# Patient Record
Sex: FEMALE | Race: WHITE | NOT HISPANIC OR LATINO | Employment: OTHER | URBAN - NONMETROPOLITAN AREA
[De-identification: names, ages, dates, MRNs, and addresses within clinical notes are randomized per-mention and may not be internally consistent; named-entity substitution may affect disease eponyms.]

---

## 2023-04-04 DIAGNOSIS — K21.9 GASTROESOPHAGEAL REFLUX DISEASE WITHOUT ESOPHAGITIS: Primary | ICD-10-CM

## 2023-04-04 DIAGNOSIS — K86.0 ALCOHOL-INDUCED CHRONIC PANCREATITIS (MULTI): Primary | ICD-10-CM

## 2023-04-04 DIAGNOSIS — G89.29 CHRONIC ABDOMINAL PAIN: ICD-10-CM

## 2023-04-04 DIAGNOSIS — R10.9 CHRONIC ABDOMINAL PAIN: ICD-10-CM

## 2023-04-04 PROBLEM — L73.2 HIDRADENITIS SUPPURATIVA: Status: ACTIVE | Noted: 2023-04-04

## 2023-04-04 PROBLEM — K86.3 PANCREATIC PSEUDOCYST (HHS-HCC): Status: ACTIVE | Noted: 2023-04-04

## 2023-04-04 PROBLEM — K57.32 DIVERTICULITIS, COLON: Status: ACTIVE | Noted: 2023-04-04

## 2023-04-04 PROBLEM — E11.40 DIABETIC NEUROPATHY, PAINFUL (MULTI): Status: ACTIVE | Noted: 2023-04-04

## 2023-04-04 PROBLEM — R39.12 WEAK URINARY STREAM: Status: RESOLVED | Noted: 2023-04-04 | Resolved: 2023-04-04

## 2023-04-04 PROBLEM — K46.9 ABDOMINAL HERNIA: Status: ACTIVE | Noted: 2023-04-04

## 2023-04-04 PROBLEM — R80.9: Status: ACTIVE | Noted: 2023-04-04

## 2023-04-04 PROBLEM — E10.65 UNCONTROLLED TYPE 1 DIABETES MELLITUS WITH HYPERGLYCEMIA (MULTI): Status: ACTIVE | Noted: 2023-04-04

## 2023-04-04 PROBLEM — I34.0 MITRAL REGURGITATION: Status: ACTIVE | Noted: 2023-04-04

## 2023-04-04 PROBLEM — I07.1 TRICUSPID REGURGITATION: Status: ACTIVE | Noted: 2023-04-04

## 2023-04-04 PROBLEM — Q18.1 CYST ON EAR: Status: RESOLVED | Noted: 2023-04-04 | Resolved: 2023-04-04

## 2023-04-04 PROBLEM — E87.1 HYPONATREMIA: Status: ACTIVE | Noted: 2023-04-04

## 2023-04-04 PROBLEM — D05.11 DUCTAL CARCINOMA IN SITU (DCIS) OF RIGHT BREAST: Status: ACTIVE | Noted: 2023-04-04

## 2023-04-04 PROBLEM — R60.0 EDEMA OF BOTH LEGS: Status: ACTIVE | Noted: 2023-04-04

## 2023-04-04 PROBLEM — H95.192 ENCOUNTER FOR DEBRIDEMENT OF LEFT POSTMASTOIDECTOMY CAVITY: Status: RESOLVED | Noted: 2023-04-04 | Resolved: 2023-04-04

## 2023-04-04 PROBLEM — E10.29: Status: ACTIVE | Noted: 2023-04-04

## 2023-04-04 PROBLEM — J30.9 ALLERGIC RHINITIS: Status: ACTIVE | Noted: 2023-04-04

## 2023-04-04 PROBLEM — I42.9 CARDIOMYOPATHY (MULTI): Status: RESOLVED | Noted: 2023-04-04 | Resolved: 2023-04-04

## 2023-04-04 PROBLEM — R74.8 ELEVATED LIVER ENZYMES: Status: ACTIVE | Noted: 2023-04-04

## 2023-04-04 PROBLEM — R11.2 NAUSEA AND VOMITING: Status: RESOLVED | Noted: 2023-04-04 | Resolved: 2023-04-04

## 2023-04-04 PROBLEM — G47.00 INSOMNIA: Status: ACTIVE | Noted: 2023-04-04

## 2023-04-04 PROBLEM — K86.1 CHRONIC PANCREATITIS (MULTI): Status: ACTIVE | Noted: 2023-04-04

## 2023-04-04 PROBLEM — J45.909 ASTHMA (HHS-HCC): Status: ACTIVE | Noted: 2023-04-04

## 2023-04-04 PROBLEM — R92.8 ABNORMALITY OF RIGHT BREAST ON SCREENING MAMMOGRAM: Status: ACTIVE | Noted: 2023-04-04

## 2023-04-04 PROBLEM — F41.1 GENERALIZED ANXIETY DISORDER: Status: ACTIVE | Noted: 2023-04-04

## 2023-04-04 PROBLEM — I51.81 STRESS-INDUCED CARDIOMYOPATHY: Status: RESOLVED | Noted: 2023-04-04 | Resolved: 2023-04-04

## 2023-04-04 PROBLEM — F33.1 MDD (MAJOR DEPRESSIVE DISORDER), RECURRENT EPISODE, MODERATE (MULTI): Status: ACTIVE | Noted: 2023-04-04

## 2023-04-04 PROBLEM — E53.8 VITAMIN B12 DEFICIENCY: Status: ACTIVE | Noted: 2023-04-04

## 2023-04-04 PROBLEM — R53.83 FATIGUE: Status: RESOLVED | Noted: 2023-04-04 | Resolved: 2023-04-04

## 2023-04-04 PROBLEM — R59.9 ENLARGED LYMPH NODE: Status: RESOLVED | Noted: 2023-04-04 | Resolved: 2023-04-04

## 2023-04-04 RX ORDER — OXYCODONE HYDROCHLORIDE 10 MG/1
10 TABLET ORAL EVERY 4 HOURS PRN
Qty: 150 TABLET | Refills: 0 | Status: SHIPPED | OUTPATIENT
Start: 2023-04-04 | End: 2023-05-04 | Stop reason: SDUPTHER

## 2023-04-04 RX ORDER — FAMOTIDINE 40 MG/1
40 TABLET, FILM COATED ORAL DAILY
Qty: 90 TABLET | Refills: 1 | Status: SHIPPED | OUTPATIENT
Start: 2023-04-04 | End: 2023-07-25

## 2023-04-04 RX ORDER — OXYCODONE HYDROCHLORIDE 10 MG/1
10 TABLET ORAL EVERY 4 HOURS PRN
COMMUNITY
Start: 2020-03-31 | End: 2023-04-04 | Stop reason: SDUPTHER

## 2023-04-04 NOTE — TELEPHONE ENCOUNTER
WANTS AN RX FOR FAMODINE FOR HER STOMACH, HAS BEEN USING HER HUSBANDS. TO [facility]

## 2023-04-04 NOTE — PROGRESS NOTES
Subjective   Patient ID: Laxmi Sousa is a 52 y.o. female who presents for No chief complaint on file..  HPI    Review of Systems    Objective   Physical Exam    Assessment/Plan

## 2023-04-26 DIAGNOSIS — E11.40 DIABETIC NEUROPATHY, PAINFUL (MULTI): Primary | ICD-10-CM

## 2023-04-26 DIAGNOSIS — J30.1 NON-SEASONAL ALLERGIC RHINITIS DUE TO POLLEN: Primary | ICD-10-CM

## 2023-04-26 RX ORDER — FLUTICASONE PROPIONATE 50 MCG
2 SPRAY, SUSPENSION (ML) NASAL DAILY
Qty: 48 G | Refills: 3 | Status: SHIPPED | OUTPATIENT
Start: 2023-04-26 | End: 2024-01-12 | Stop reason: SDUPTHER

## 2023-04-26 RX ORDER — GABAPENTIN 800 MG/1
800 TABLET ORAL 4 TIMES DAILY
Qty: 360 TABLET | Refills: 1 | Status: SHIPPED | OUTPATIENT
Start: 2023-04-26 | End: 2023-10-11

## 2023-04-26 RX ORDER — FLUTICASONE PROPIONATE 50 MCG
2 SPRAY, SUSPENSION (ML) NASAL DAILY
COMMUNITY
Start: 2013-05-13 | End: 2023-04-26 | Stop reason: SDUPTHER

## 2023-04-26 RX ORDER — GABAPENTIN 800 MG/1
1 TABLET ORAL 4 TIMES DAILY
COMMUNITY
Start: 2021-04-21 | End: 2023-04-26 | Stop reason: SDUPTHER

## 2023-05-04 DIAGNOSIS — R10.9 CHRONIC ABDOMINAL PAIN: ICD-10-CM

## 2023-05-04 DIAGNOSIS — K86.0 ALCOHOL-INDUCED CHRONIC PANCREATITIS (MULTI): ICD-10-CM

## 2023-05-04 DIAGNOSIS — G89.29 CHRONIC ABDOMINAL PAIN: ICD-10-CM

## 2023-05-04 RX ORDER — OXYCODONE HYDROCHLORIDE 10 MG/1
10 TABLET ORAL EVERY 4 HOURS PRN
Qty: 150 TABLET | Refills: 0 | Status: SHIPPED | OUTPATIENT
Start: 2023-05-04 | End: 2023-06-02 | Stop reason: SDUPTHER

## 2023-05-08 ENCOUNTER — OFFICE VISIT (OUTPATIENT)
Dept: PRIMARY CARE | Facility: CLINIC | Age: 53
End: 2023-05-08
Payer: MEDICARE

## 2023-05-08 VITALS
HEART RATE: 66 BPM | OXYGEN SATURATION: 97 % | HEIGHT: 62 IN | SYSTOLIC BLOOD PRESSURE: 122 MMHG | BODY MASS INDEX: 22.82 KG/M2 | WEIGHT: 124 LBS | DIASTOLIC BLOOD PRESSURE: 64 MMHG

## 2023-05-08 DIAGNOSIS — E10.65 UNCONTROLLED TYPE 1 DIABETES MELLITUS WITH HYPERGLYCEMIA (MULTI): ICD-10-CM

## 2023-05-08 DIAGNOSIS — J45.40 MODERATE PERSISTENT ASTHMA, UNSPECIFIED WHETHER COMPLICATED (HHS-HCC): ICD-10-CM

## 2023-05-08 DIAGNOSIS — R10.9 CHRONIC ABDOMINAL PAIN: Primary | ICD-10-CM

## 2023-05-08 DIAGNOSIS — F33.1 MDD (MAJOR DEPRESSIVE DISORDER), RECURRENT EPISODE, MODERATE (MULTI): ICD-10-CM

## 2023-05-08 DIAGNOSIS — D05.11 DUCTAL CARCINOMA IN SITU (DCIS) OF RIGHT BREAST: ICD-10-CM

## 2023-05-08 DIAGNOSIS — G89.29 CHRONIC ABDOMINAL PAIN: Primary | ICD-10-CM

## 2023-05-08 DIAGNOSIS — M19.049 LOCALIZED OSTEOARTHRITIS OF HAND, UNSPECIFIED LATERALITY: ICD-10-CM

## 2023-05-08 DIAGNOSIS — K86.0 ALCOHOL-INDUCED CHRONIC PANCREATITIS (MULTI): ICD-10-CM

## 2023-05-08 PROCEDURE — 99214 OFFICE O/P EST MOD 30 MIN: CPT | Performed by: FAMILY MEDICINE

## 2023-05-08 PROCEDURE — 3078F DIAST BP <80 MM HG: CPT | Performed by: FAMILY MEDICINE

## 2023-05-08 PROCEDURE — 4004F PT TOBACCO SCREEN RCVD TLK: CPT | Performed by: FAMILY MEDICINE

## 2023-05-08 PROCEDURE — 3074F SYST BP LT 130 MM HG: CPT | Performed by: FAMILY MEDICINE

## 2023-05-08 PROCEDURE — 4010F ACE/ARB THERAPY RXD/TAKEN: CPT | Performed by: FAMILY MEDICINE

## 2023-05-08 RX ORDER — FLUTICASONE PROPIONATE AND SALMETEROL 250; 50 UG/1; UG/1
1 POWDER RESPIRATORY (INHALATION)
COMMUNITY
Start: 2022-02-23 | End: 2023-05-08 | Stop reason: SDUPTHER

## 2023-05-08 RX ORDER — ONDANSETRON 4 MG/1
8 TABLET, FILM COATED ORAL EVERY 12 HOURS PRN
COMMUNITY
Start: 2017-07-07 | End: 2023-09-29 | Stop reason: SDUPTHER

## 2023-05-08 RX ORDER — TRAZODONE HYDROCHLORIDE 50 MG/1
50 TABLET ORAL 3 TIMES DAILY
COMMUNITY
Start: 2016-09-23 | End: 2023-05-24

## 2023-05-08 RX ORDER — PANTOPRAZOLE SODIUM 40 MG/1
40 TABLET, DELAYED RELEASE ORAL
COMMUNITY
Start: 2020-05-14

## 2023-05-08 RX ORDER — ASPIRIN 81 MG/1
81 TABLET ORAL DAILY
Status: ON HOLD | COMMUNITY
Start: 2020-11-21 | End: 2023-12-15 | Stop reason: ALTCHOICE

## 2023-05-08 RX ORDER — METOPROLOL SUCCINATE 25 MG/1
25 TABLET, EXTENDED RELEASE ORAL DAILY
COMMUNITY
Start: 2020-11-21 | End: 2024-01-12 | Stop reason: ALTCHOICE

## 2023-05-08 RX ORDER — BLOOD-GLUCOSE TRANSMITTER
EACH MISCELLANEOUS AS NEEDED
COMMUNITY
Start: 2023-03-14

## 2023-05-08 RX ORDER — FLUTICASONE PROPIONATE AND SALMETEROL 250; 50 UG/1; UG/1
1 POWDER RESPIRATORY (INHALATION)
Qty: 180 EACH | Refills: 3 | Status: SHIPPED | OUTPATIENT
Start: 2023-05-08 | End: 2023-07-25

## 2023-05-08 RX ORDER — ATORVASTATIN CALCIUM 10 MG/1
10 TABLET, FILM COATED ORAL DAILY
COMMUNITY
Start: 2019-06-20 | End: 2023-05-08 | Stop reason: SDUPTHER

## 2023-05-08 RX ORDER — ALBUTEROL SULFATE 90 UG/1
2 AEROSOL, METERED RESPIRATORY (INHALATION) EVERY 4 HOURS PRN
Qty: 18 G | Refills: 3 | Status: SHIPPED | OUTPATIENT
Start: 2023-05-08 | End: 2023-10-11

## 2023-05-08 RX ORDER — HYDROXYZINE HYDROCHLORIDE 50 MG/1
50 TABLET, FILM COATED ORAL 4 TIMES DAILY
COMMUNITY
Start: 2021-09-08

## 2023-05-08 RX ORDER — BLOOD-GLUCOSE SENSOR
EACH MISCELLANEOUS
COMMUNITY
Start: 2023-03-14

## 2023-05-08 RX ORDER — TRIAMTERENE/HYDROCHLOROTHIAZID 37.5-25 MG
1 TABLET ORAL DAILY
COMMUNITY
Start: 2021-09-17

## 2023-05-08 RX ORDER — ATORVASTATIN CALCIUM 10 MG/1
10 TABLET, FILM COATED ORAL DAILY
Qty: 90 TABLET | Refills: 3 | Status: SHIPPED | OUTPATIENT
Start: 2023-05-08 | End: 2024-01-12 | Stop reason: SDUPTHER

## 2023-05-08 RX ORDER — ALBUTEROL SULFATE 90 UG/1
2 AEROSOL, METERED RESPIRATORY (INHALATION) EVERY 4 HOURS PRN
COMMUNITY
Start: 2020-06-22 | End: 2023-05-08 | Stop reason: SDUPTHER

## 2023-05-08 RX ORDER — BLOOD-GLUCOSE,RECEIVER,CONT
1 EACH MISCELLANEOUS AS NEEDED
COMMUNITY
Start: 2022-07-07 | End: 2024-02-05 | Stop reason: WASHOUT

## 2023-05-08 RX ORDER — ANASTROZOLE 1 MG/1
1 TABLET ORAL DAILY
COMMUNITY
Start: 2022-11-03

## 2023-05-08 RX ORDER — LISINOPRIL 2.5 MG/1
2.5 TABLET ORAL DAILY
COMMUNITY
Start: 2016-12-10 | End: 2024-01-12 | Stop reason: SDUPTHER

## 2023-05-08 RX ORDER — MENTHOL 1.4 %
ADHESIVE PATCH, MEDICATED TOPICAL
Qty: 113 G | Refills: 3 | Status: ON HOLD | OUTPATIENT
Start: 2023-05-08 | End: 2023-12-15 | Stop reason: ALTCHOICE

## 2023-05-08 ASSESSMENT — LIFESTYLE VARIABLES: TOTAL SCORE: 1

## 2023-05-08 NOTE — PROGRESS NOTES
Subjective   Patient ID: Laxmi Sousa is a 52 y.o. female who presents for Follow-up (Medication follow up ).  HPI  Got the pump- A1c 7.5  Counting carbs and trying to watch eating now  No CP, SOB, palpitations, weakness, HA, vision changes  Gets numbness in hands and legs- on gabapentin  Pain in hands in the morning- hands will cramp when tries to do needle point  Some dizziness if gets up too fast    Pain in left flank pain- oxycodone helps with the pain (some times can get away doing only 30 mg instead of 40 mg)  No n/v  Having usual greasy diarrhea    Ophtho- 9/21- needs to see  Dentist- keeping up with  [place]- 6/22  Colonoscopy- 3/18 (repeat 3 years)- will be calling [place]  GABRIELLA-  UA/Micro- 4/21  Mammo- 7/22- per mammogram  DEXA- 11/22  PAP- 2015- will see GYN  Lung CT-  AAA-  EKG-  Pneumovax- 2013  Prevnar-  Flu- 9/22  Shingrix-  Td-     OARRS:  Neil Louie,  on 5/8/2023  4:33 PM  I have personally reviewed the OARRS report for Laxmi Sousa. I have considered the risks of abuse, dependence, addiction and diversion    Is the patient prescribed a combination of a benzodiazepine and opioid?  No    Last Urine Drug Screen / ordered today: No- will get at follow up  Recent Results (from the past 56215 hour(s))   OPIATE/OPIOID/BENZO PRESCRIPTION COMPLIANCE    Collection Time: 04/21/21  3:55 PM   Result Value Ref Range    DRUG SCREEN COMMENT URINE SEE BELOW     Creatine, Urine 13.4 (A) mg/dL    Specific Gravity, Urine Drug 1.0250 Valid 1.0020-1.0200    Amphetamine Screen, Urine PRESUMPTIVE NEGATIVE NEGATIVE    Barbiturate Screen, Urine PRESUMPTIVE NEGATIVE NEGATIVE    Cannabinoid Screen, Urine PRESUMPTIVE NEGATIVE NEGATIVE    Cocaine Screen, Urine PRESUMPTIVE NEGATIVE NEGATIVE    PCP Screen, Urine PRESUMPTIVE NEGATIVE NEGATIVE    7-Aminoclonazepam <25 Cutoff <25 ng/mL    Alpha-Hydroxyalprazolam <25 Cutoff <25 ng/mL    Alpha-Hydroxymidazolam <25 Cutoff <25 ng/mL    Alprazolam <25 Cutoff <25 ng/mL     Chlordiazepoxide <25 Cutoff <25 ng/mL    Clonazepam <25 Cutoff <25 ng/mL    Diazepam <25 Cutoff <25 ng/mL    Lorazepam <25 Cutoff <25 ng/mL    Midazolam <25 Cutoff <25 ng/mL    Nordiazepam <25 Cutoff <25 ng/mL    Oxazepam <25 Cutoff <25 ng/mL    Temazepam <25 Cutoff <25 ng/mL    Zolpidem <25 Cutoff <25 ng/mL    Zolpidem Metabolite (ZCA) <25 Cutoff <25 ng/mL    6-Acetylmorphine <25 Cutoff <25 ng/mL    Codeine <50 Cutoff <50 ng/mL    Hydrocodone <25 Cutoff <25 ng/mL    Hydromorphone <25 Cutoff <25 ng/mL    Morphine Urine <50 Cutoff <50 ng/mL    Norhydrocodone <25 Cutoff <25 ng/mL    Noroxycodone >1000 (A) Cutoff <25 ng/mL    Oxycodone 327 (A) Cutoff <25 ng/mL    Oxymorphone 42 (A) Cutoff <25 ng/mL    Tramadol <50 Cutoff <50 ng/mL    O-Desmethyltramadol <50 Cutoff <50 ng/mL    Fentanyl <2.5 Cutoff<2.5 ng/mL    Norfentanyl <2.5 Cutoff<2.5 ng/mL    METHADONE CONFIRMATION,URINE <25 Cutoff <25 ng/mL    EDDP <25 Cutoff <25 ng/mL     Results are as expected.     Controlled Substance Agreement:  Date of the Last Agreement: 2023  Reviewed Controlled Substance Agreement including but not limited to the benefits, risks, and alternatives to treatment with a Controlled Substance medication(s).    Opioids:  What is the patient's goal of therapy? Better functioning during the day  Is this being achieved with current treatment? Yes    I have calculated the patient's Morphine Dose Equivalent (MED):   I have considered referral to Pain Management and/or a specialist, and do not feel it is necessary at this time.    I feel that it is clinically indicated to continue this current medication regimen after consideration of alternative therapies, and other non-opioid treatment.    Opioid Risk Screening:  Family History of Substance Abuse  Alcohol: 0 (2023  4:00 PM)  Illegal Dru (2023  4:00 PM)  Prescription Dru (2023  4:00 PM)    Personal History of Substance Abuse  Alcohol: 0 (2023  4:00 PM)  Illegal Drugs: 0  (5/8/2023  4:00 PM)  Prescription Drugs: 0 (5/8/2023  4:00 PM)    Patient Age (16-45)  Age (16-45): 0 (5/8/2023  4:00 PM)    History of Preadolescent Sexual Abuse  History of Preadolescent Sexual Abuse: 0 (5/8/2023  4:00 PM)    Psychological Disease  Attention Deficit Disorder, Obsessive Compulsive Disorder, Bipolar, Schizophrenia: 0 (5/8/2023  4:00 PM)  Depression: 1 (5/8/2023  4:00 PM)    Total Score  Total Score: 1 (5/8/2023  4:00 PM)    Total Score Risk Category  TOTAL SCORE CATEGORY: Low Risk (0-3) (5/8/2023  4:00 PM)        Pain Assessment:  Analgesia  What was your pain level on average during the past week?: 6  What was your pain level at its worst during the past week?: 8  What percentage of your pain has been relieved during the past week?: 80 %  Is the amount of pain relief you are now obtaining from your current pain relievers enough to make a real difference in your life?: Y  Query to Clinician: Is the patient's pain relief clinically significant?: Yes    Activities of Daily Living  Physical Functioning: Better  Family Relationships: Better  Social Relationships: Better  Mood: Better  Sleep Patterns: Better  Overall Functioning: Better    Adverse Events  Is patient experiencing any side effects from current pain relievers?: N  Patient's Overall Severity of Side Effects: None      Assessment  Is your overall impression that this patient is benefiting from opioid therapy?: Yes  Specific Analgesic Plan: Continue present regimen              Current Outpatient Medications:     anastrozole (Arimidex) 1 mg tablet, Take 1 tablet (1 mg total) by mouth once daily., Disp: , Rfl:     aspirin 81 mg EC tablet, Take 1 tablet (81 mg) by mouth once daily., Disp: , Rfl:     Dexcom G6  misc, Inject 1 Device under the skin if needed., Disp: , Rfl:     Dexcom G6 Sensor device, CHANGE every 10 DAYS as directed, Disp: , Rfl:     Dexcom G6 Transmitter device, Inject under the skin if needed., Disp: , Rfl:      hydrOXYzine HCL (Atarax) 50 mg tablet, Take 1 tablet (50 mg) by mouth 4 times a day., Disp: , Rfl:     lisinopril 2.5 mg tablet, Take 1 tablet (2.5 mg) by mouth once daily., Disp: , Rfl:     metoprolol succinate XL (Toprol-XL) 25 mg 24 hr tablet, Take 1 tablet (25 mg) by mouth once daily., Disp: , Rfl:     ondansetron (Zofran) 4 mg tablet, Take 2 tablets (8 mg) by mouth every 12 hours if needed., Disp: , Rfl:     pantoprazole (ProtoNix) 40 mg EC tablet, Take 1 tablet (40 mg) by mouth once daily in the morning. Take before meals., Disp: , Rfl:     traZODone (Desyrel) 50 mg tablet, Take 1 tablet (50 mg) by mouth 3 times a day., Disp: , Rfl:     triamterene-hydrochlorothiazid (Maxzide-25) 37.5-25 mg tablet, Take 1 tablet by mouth once daily., Disp: , Rfl:     albuterol 90 mcg/actuation inhaler, Inhale 2 puffs every 4 hours if needed for shortness of breath., Disp: 18 g, Rfl: 3    atorvastatin (Lipitor) 10 mg tablet, Take 1 tablet (10 mg) by mouth once daily., Disp: 90 tablet, Rfl: 3    camphor-methyl salicyl-menthoL (Bengay Ultra Strength) 4-30-10 % cream, Apply twice a day to hands, Disp: 113 g, Rfl: 3    famotidine (Pepcid) 40 mg tablet, Take 1 tablet (40 mg) by mouth once daily., Disp: 90 tablet, Rfl: 1    fluticasone (Flonase) 50 mcg/actuation nasal spray, Administer 2 sprays into each nostril once daily., Disp: 48 g, Rfl: 3    fluticasone propion-salmeteroL (Advair Diskus) 250-50 mcg/dose diskus inhaler, Inhale 1 puff 2 times a day., Disp: 180 each, Rfl: 3    gabapentin (Neurontin) 800 mg tablet, Take 1 tablet (800 mg) by mouth 4 times a day., Disp: 360 tablet, Rfl: 1    oxyCODONE (Roxicodone) 10 mg immediate release tablet, Take 1 tablet (10 mg) by mouth every 4 hours if needed for severe pain (7 - 10)., Disp: 150 tablet, Rfl: 0   Past Surgical History:   Procedure Laterality Date    CHOLECYSTECTOMY  07/05/2013    Cholecystectomy Laparoscopic    LITHOTRIPSY  07/05/2013    Renal Lithotripsy    OTHER SURGICAL  HISTORY  2013    Endoscopic Retrograde Cholangiopancreatography (ERCP)    OTHER SURGICAL HISTORY  2019     section    OTHER SURGICAL HISTORY  2014    ERCP With Change Of Tube/Stent    OTHER SURGICAL HISTORY  2015    Incision And Drainage Of Skin Abscess Buttocks    TONSILLECTOMY  2018    Tonsillectomy    VENTRAL HERNIA REPAIR  2018    Ventral Hernia Repair      Past Medical History:   Diagnosis Date    Acute actinic otitis externa, left ear 2020    Acute actinic otitis externa of left ear    Acute candidiasis of vulva and vagina 2016    Vaginitis due to Candida albicans    Acute mastoiditis without complications, left ear 2020    Acute mastoiditis of left side    Acute serous otitis media, left ear 2020    Non-recurrent acute serous otitis media of left ear    Acute suppurative otitis media without spontaneous rupture of ear drum, left ear 2020    Non-recurrent acute suppurative otitis media of left ear without spontaneous rupture of tympanic membrane    Cutaneous abscess of buttock 2015    Abscess of buttock, right    Encounter for debridement of left postmastoidectomy cavity 2023    Enlarged lymph node 2023    Fatigue 2023    Other chronic nonsuppurative otitis media, left ear 2021    Other chronic nonsuppurative otitis media of left ear    Other chronic suppurative otitis media, left ear 2020    Chronic suppurative otitis media of left ear, unspecified otitis media location    Other obesity due to excess calories 2020    Class 1 obesity due to excess calories with serious comorbidity and body mass index (BMI) of 30.0 to 30.9 in adult    Personal history of other diseases of the digestive system     History of chronic pancreatitis    Personal history of other diseases of the respiratory system 2021    History of acute bronchitis    Personal history of other diseases of the respiratory system  "01/23/2017    History of acute sinusitis    Personal history of other mental and behavioral disorders 07/07/2017    History of depression    Personal history of other specified conditions 08/28/2019    History of painful urination    Stress-induced cardiomyopathy 04/04/2023    Swimmer's ear, left ear 04/28/2020    Acute swimmer's ear of left side    Viral conjunctivitis, unspecified 07/16/2021    Acute viral conjunctivitis, unspecified laterality    Weak urinary stream 04/04/2023     Social History     Tobacco Use    Smoking status: Every Day     Packs/day: 1.00     Types: Cigarettes    Smokeless tobacco: Never   Substance Use Topics    Alcohol use: Never    Drug use: Never      No family history on file.   Review of Systems    Objective   /64   Pulse 66   Ht 1.575 m (5' 2\")   Wt 56.2 kg (124 lb)   SpO2 97%   BMI 22.68 kg/m²    Physical Exam  Vitals and nursing note reviewed.   Constitutional:       General: She is not in acute distress.     Appearance: Normal appearance.   HENT:      Head: Normocephalic and atraumatic.      Right Ear: Tympanic membrane, ear canal and external ear normal.      Left Ear: Tympanic membrane, ear canal and external ear normal.      Nose: Nose normal.      Mouth/Throat:      Mouth: Mucous membranes are moist.      Pharynx: Oropharynx is clear.   Eyes:      Extraocular Movements: Extraocular movements intact.      Pupils: Pupils are equal, round, and reactive to light.   Neck:      Vascular: No carotid bruit.   Cardiovascular:      Rate and Rhythm: Normal rate and regular rhythm.      Pulses: Normal pulses.      Heart sounds: Normal heart sounds. No murmur heard.  Pulmonary:      Effort: Pulmonary effort is normal.      Breath sounds: Normal breath sounds.   Abdominal:      General: Abdomen is flat. Bowel sounds are normal.      Palpations: Abdomen is soft. There is no mass.      Tenderness: There is generalized abdominal tenderness.   Musculoskeletal:         General: Normal " range of motion.      Cervical back: Normal range of motion and neck supple.   Lymphadenopathy:      Cervical: No cervical adenopathy.   Skin:     Capillary Refill: Capillary refill takes less than 2 seconds.   Neurological:      General: No focal deficit present.      Mental Status: She is alert and oriented to person, place, and time.      Cranial Nerves: No cranial nerve deficit.      Motor: No weakness.      Deep Tendon Reflexes: Reflexes normal.   Psychiatric:         Mood and Affect: Mood normal.         Behavior: Behavior normal.         Assessment/Plan   Problem List Items Addressed This Visit       Asthma    Relevant Medications    fluticasone propion-salmeteroL (Advair Diskus) 250-50 mcg/dose diskus inhaler    albuterol 90 mcg/actuation inhaler    Chronic abdominal pain - Primary    Chronic pancreatitis (CMS/HCC)    Ductal carcinoma in situ (DCIS) of right breast    MDD (major depressive disorder), recurrent episode, moderate (CMS/HCC)    Uncontrolled type 1 diabetes mellitus with hyperglycemia (CMS/HCC)    Relevant Medications    atorvastatin (Lipitor) 10 mg tablet    Localized osteoarthritis of hand    Relevant Medications    camphor-methyl salicyl-menthoL (Bengay Ultra Strength) 4-30-10 % cream   Renewed/continued rest of medications    Updated Health Maintenance in HPI section    Hyperlipidemia- continue meds, low fat/cholesterol diet    Cardiomyopathy- f/u with cardiology    Neuropathy- increase gabapentin to 900 4 times a day, control BS    MDD with anxiety- continue Effexor, Wellbutrin,hydroxyzine to 50 mg prn, will try Vraylar    Chronic Abdominal pain/pancreatitis- continue oxycodone, increase gabapentin to 800 mg 4 times a day    DM Type 1 with neuropathy- continue meds, referred to endo, [facility]    Vitamin B12 def- check level, supplement    Microalbuminuria- lisinopril, control BS    Asthma- advair, albuterol prn, allergen avoidance    Breast Cancer- F/U with oncology    F/U 3 months     Patient  understands and agrees with treatment plan    Neil Louie, DO

## 2023-05-24 DIAGNOSIS — G47.00 INSOMNIA, UNSPECIFIED: ICD-10-CM

## 2023-05-24 RX ORDER — TRAZODONE HYDROCHLORIDE 50 MG/1
TABLET ORAL
Qty: 270 TABLET | Refills: 1 | Status: SHIPPED | OUTPATIENT
Start: 2023-05-24 | End: 2023-11-24

## 2023-06-02 DIAGNOSIS — K86.0 ALCOHOL-INDUCED CHRONIC PANCREATITIS (MULTI): ICD-10-CM

## 2023-06-02 DIAGNOSIS — G89.29 CHRONIC ABDOMINAL PAIN: ICD-10-CM

## 2023-06-02 DIAGNOSIS — R10.9 CHRONIC ABDOMINAL PAIN: ICD-10-CM

## 2023-06-02 RX ORDER — OXYCODONE HYDROCHLORIDE 10 MG/1
10 TABLET ORAL EVERY 4 HOURS PRN
Qty: 150 TABLET | Refills: 0 | Status: SHIPPED | OUTPATIENT
Start: 2023-06-02 | End: 2023-06-29 | Stop reason: SDUPTHER

## 2023-06-29 DIAGNOSIS — R10.9 CHRONIC ABDOMINAL PAIN: ICD-10-CM

## 2023-06-29 DIAGNOSIS — G89.29 CHRONIC ABDOMINAL PAIN: ICD-10-CM

## 2023-06-29 DIAGNOSIS — K86.0 ALCOHOL-INDUCED CHRONIC PANCREATITIS (MULTI): ICD-10-CM

## 2023-06-29 RX ORDER — OXYCODONE HYDROCHLORIDE 10 MG/1
10 TABLET ORAL EVERY 4 HOURS PRN
Qty: 150 TABLET | Refills: 0 | Status: SHIPPED | OUTPATIENT
Start: 2023-06-29 | End: 2023-08-02 | Stop reason: SDUPTHER

## 2023-06-29 RX ORDER — OXYCODONE HYDROCHLORIDE 10 MG/1
10 TABLET ORAL EVERY 4 HOURS PRN
Qty: 150 TABLET | Refills: 0 | Status: CANCELLED | OUTPATIENT
Start: 2023-06-29 | End: 2023-07-29

## 2023-07-19 ENCOUNTER — TELEPHONE (OUTPATIENT)
Dept: PRIMARY CARE | Facility: CLINIC | Age: 53
End: 2023-07-19
Payer: MEDICARE

## 2023-07-19 DIAGNOSIS — J01.80 ACUTE NON-RECURRENT SINUSITIS OF OTHER SINUS: Primary | ICD-10-CM

## 2023-07-19 RX ORDER — AMOXICILLIN 875 MG/1
875 TABLET, FILM COATED ORAL 2 TIMES DAILY
Qty: 20 TABLET | Refills: 0 | Status: SHIPPED | OUTPATIENT
Start: 2023-07-19 | End: 2023-07-29

## 2023-07-25 DIAGNOSIS — J45.40 MODERATE PERSISTENT ASTHMA, UNSPECIFIED WHETHER COMPLICATED (HHS-HCC): ICD-10-CM

## 2023-07-25 DIAGNOSIS — K21.9 GASTROESOPHAGEAL REFLUX DISEASE WITHOUT ESOPHAGITIS: ICD-10-CM

## 2023-07-25 RX ORDER — FLUTICASONE PROPIONATE AND SALMETEROL 250; 50 UG/1; UG/1
1 POWDER RESPIRATORY (INHALATION) 2 TIMES DAILY
Qty: 180 EACH | Refills: 1 | Status: SHIPPED | OUTPATIENT
Start: 2023-07-25 | End: 2024-01-16

## 2023-07-25 RX ORDER — FAMOTIDINE 40 MG/1
40 TABLET, FILM COATED ORAL DAILY
Qty: 90 TABLET | Refills: 1 | Status: SHIPPED | OUTPATIENT
Start: 2023-07-25 | End: 2024-03-13

## 2023-08-02 DIAGNOSIS — R10.9 CHRONIC ABDOMINAL PAIN: ICD-10-CM

## 2023-08-02 DIAGNOSIS — K86.0 ALCOHOL-INDUCED CHRONIC PANCREATITIS (MULTI): ICD-10-CM

## 2023-08-02 DIAGNOSIS — G89.29 CHRONIC ABDOMINAL PAIN: ICD-10-CM

## 2023-08-02 RX ORDER — OXYCODONE HYDROCHLORIDE 10 MG/1
10 TABLET ORAL EVERY 4 HOURS PRN
Qty: 150 TABLET | Refills: 0 | Status: SHIPPED | OUTPATIENT
Start: 2023-08-02 | End: 2023-08-28 | Stop reason: SDUPTHER

## 2023-08-25 ENCOUNTER — APPOINTMENT (OUTPATIENT)
Dept: PRIMARY CARE | Facility: CLINIC | Age: 53
End: 2023-08-25
Payer: MEDICARE

## 2023-08-28 DIAGNOSIS — K86.0 ALCOHOL-INDUCED CHRONIC PANCREATITIS (MULTI): ICD-10-CM

## 2023-08-28 DIAGNOSIS — G89.29 CHRONIC ABDOMINAL PAIN: ICD-10-CM

## 2023-08-28 DIAGNOSIS — K86.1 CHRONIC PANCREATITIS, UNSPECIFIED PANCREATITIS TYPE (MULTI): Primary | ICD-10-CM

## 2023-08-28 DIAGNOSIS — R10.9 CHRONIC ABDOMINAL PAIN: ICD-10-CM

## 2023-08-28 RX ORDER — NALOXONE HYDROCHLORIDE 4 MG/.1ML
4 SPRAY NASAL AS NEEDED
Qty: 2 EACH | Refills: 1 | Status: SHIPPED | OUTPATIENT
Start: 2023-08-28 | End: 2024-08-27

## 2023-08-28 RX ORDER — OXYCODONE HYDROCHLORIDE 10 MG/1
10 TABLET ORAL EVERY 4 HOURS PRN
Qty: 150 TABLET | Refills: 0 | Status: SHIPPED | OUTPATIENT
Start: 2023-08-28 | End: 2023-09-29 | Stop reason: SDUPTHER

## 2023-09-15 ENCOUNTER — OFFICE VISIT (OUTPATIENT)
Dept: PRIMARY CARE | Facility: CLINIC | Age: 53
End: 2023-09-15
Payer: MEDICARE

## 2023-09-15 VITALS
BODY MASS INDEX: 24.29 KG/M2 | HEIGHT: 62 IN | DIASTOLIC BLOOD PRESSURE: 74 MMHG | OXYGEN SATURATION: 96 % | SYSTOLIC BLOOD PRESSURE: 126 MMHG | HEART RATE: 62 BPM | WEIGHT: 132 LBS

## 2023-09-15 DIAGNOSIS — E53.8 VITAMIN B12 DEFICIENCY: ICD-10-CM

## 2023-09-15 DIAGNOSIS — M25.50 POLYARTHRALGIA: ICD-10-CM

## 2023-09-15 DIAGNOSIS — E10.65 UNCONTROLLED TYPE 1 DIABETES MELLITUS WITH HYPERGLYCEMIA (MULTI): Primary | ICD-10-CM

## 2023-09-15 DIAGNOSIS — E10.29: ICD-10-CM

## 2023-09-15 DIAGNOSIS — K86.0 ALCOHOL-INDUCED CHRONIC PANCREATITIS (MULTI): ICD-10-CM

## 2023-09-15 DIAGNOSIS — Z79.891 OPIATE ANALGESIC CONTRACT EXISTS: ICD-10-CM

## 2023-09-15 DIAGNOSIS — J30.1 NON-SEASONAL ALLERGIC RHINITIS DUE TO POLLEN: ICD-10-CM

## 2023-09-15 DIAGNOSIS — K86.81 EXOCRINE PANCREATIC INSUFFICIENCY (HHS-HCC): ICD-10-CM

## 2023-09-15 DIAGNOSIS — F33.1 MDD (MAJOR DEPRESSIVE DISORDER), RECURRENT EPISODE, MODERATE (MULTI): ICD-10-CM

## 2023-09-15 DIAGNOSIS — R80.9: ICD-10-CM

## 2023-09-15 LAB
AMPHETAMINE (PRESENCE) IN URINE BY SCREEN METHOD: ABNORMAL
BARBITURATES PRESENCE IN URINE BY SCREEN METHOD: ABNORMAL
BENZODIAZEPINE (PRESENCE) IN URINE BY SCREEN METHOD: ABNORMAL
CANNABINOIDS IN URINE BY SCREEN METHOD: ABNORMAL
COCAINE (PRESENCE) IN URINE BY SCREEN METHOD: ABNORMAL
CREATININE (MG/DL) IN URINE: 24.7 MG/DL (ref 20–320)
DRUG SCREEN COMMENT URINE: ABNORMAL
FENTANYL URINE: ABNORMAL
OPIATES (PRESENCE) IN URINE BY SCREEN METHOD: ABNORMAL
OXYCODONE (PRESENCE) IN URINE BY SCREEN METHOD: ABNORMAL
PHENCYCLIDINE (PRESENCE) IN URINE BY SCREEN METHOD: ABNORMAL
POC APPEARANCE, URINE: CLEAR
POC BILIRUBIN, URINE: NEGATIVE
POC BLOOD, URINE: ABNORMAL
POC COLOR, URINE: YELLOW
POC GLUCOSE, URINE: ABNORMAL MG/DL
POC KETONES, URINE: NEGATIVE MG/DL
POC LEUKOCYTES, URINE: ABNORMAL
POC NITRITE,URINE: POSITIVE
POC PH, URINE: 5.5 PH
POC PROTEIN, URINE: NEGATIVE MG/DL
POC SPECIFIC GRAVITY, URINE: 1.01
POC UROBILINOGEN, URINE: 0.2 EU/DL
PROTEIN (MG/DL) IN URINE: 8 MG/DL (ref 5–24)
PROTEIN/CREATININE (MG/MG) IN URINE: 0.32 MG/MG CREAT (ref 0–0.17)

## 2023-09-15 PROCEDURE — 81003 URINALYSIS AUTO W/O SCOPE: CPT | Performed by: FAMILY MEDICINE

## 2023-09-15 PROCEDURE — 82570 ASSAY OF URINE CREATININE: CPT

## 2023-09-15 PROCEDURE — 3074F SYST BP LT 130 MM HG: CPT | Performed by: FAMILY MEDICINE

## 2023-09-15 PROCEDURE — 99214 OFFICE O/P EST MOD 30 MIN: CPT | Performed by: FAMILY MEDICINE

## 2023-09-15 PROCEDURE — 84156 ASSAY OF PROTEIN URINE: CPT

## 2023-09-15 PROCEDURE — 80307 DRUG TEST PRSMV CHEM ANLYZR: CPT

## 2023-09-15 PROCEDURE — 3078F DIAST BP <80 MM HG: CPT | Performed by: FAMILY MEDICINE

## 2023-09-15 PROCEDURE — 4010F ACE/ARB THERAPY RXD/TAKEN: CPT | Performed by: FAMILY MEDICINE

## 2023-09-15 PROCEDURE — 4004F PT TOBACCO SCREEN RCVD TLK: CPT | Performed by: FAMILY MEDICINE

## 2023-09-15 RX ORDER — LORATADINE 10 MG/1
10 TABLET ORAL DAILY
COMMUNITY
Start: 2020-05-04 | End: 2023-09-15 | Stop reason: SDUPTHER

## 2023-09-15 RX ORDER — INSULIN LISPRO 100 [IU]/ML
INJECTION, SOLUTION INTRAVENOUS; SUBCUTANEOUS
Status: ON HOLD | COMMUNITY
End: 2023-12-15 | Stop reason: ALTCHOICE

## 2023-09-15 RX ORDER — ESOMEPRAZOLE MAGNESIUM 40 MG/1
40 CAPSULE, DELAYED RELEASE ORAL
COMMUNITY

## 2023-09-15 RX ORDER — PANCRELIPASE 24000; 76000; 120000 [USP'U]/1; [USP'U]/1; [USP'U]/1
CAPSULE, DELAYED RELEASE PELLETS ORAL
Qty: 1260 CAPSULE | Refills: 1 | Status: SHIPPED | OUTPATIENT
Start: 2023-09-15

## 2023-09-15 RX ORDER — PANCRELIPASE 24000; 76000; 120000 [USP'U]/1; [USP'U]/1; [USP'U]/1
CAPSULE, DELAYED RELEASE PELLETS ORAL
COMMUNITY
End: 2023-09-15 | Stop reason: SDUPTHER

## 2023-09-15 RX ORDER — DICLOFENAC SODIUM 10 MG/G
2 GEL TOPICAL 4 TIMES DAILY PRN
Qty: 300 G | Refills: 1 | Status: SHIPPED | OUTPATIENT
Start: 2023-09-15 | End: 2023-11-27

## 2023-09-15 RX ORDER — DEXTROSE 4 G
TABLET,CHEWABLE ORAL
Qty: 1 EACH | Refills: 0 | Status: SHIPPED | OUTPATIENT
Start: 2023-09-15

## 2023-09-15 RX ORDER — LORATADINE 10 MG/1
10 TABLET ORAL DAILY
Qty: 90 TABLET | Refills: 3 | Status: SHIPPED | OUTPATIENT
Start: 2023-09-15 | End: 2024-01-12 | Stop reason: SDUPTHER

## 2023-09-15 NOTE — PROGRESS NOTES
Subjective   Patient ID: Laxmi Sousa is a 53 y.o. female who presents for Follow-up (Medication follow up  ).  HPI  No SE ,meds  Got the pump- A1c 8.4  No CP, SOB, palpitations, weakness, HA, vision changes  Gets numbness in hands and legs- on gabapentin  Pain in hands in the morning- hands will cramp when tries to do needle point  Some dizziness if gets up too fast     Pain in left flank pain- oxycodone helps with the pain (some times can get away doing only 30 mg instead of 40 mg)  No n/v  Having usual greasy diarrhea- wants to get back on creon     Ophtho- 9/23  Dentist- keeping up with  [facility]-   Colonoscopy- 3/18 (repeat 3 years)- will be calling [facility]-  UA/Micro- 4/21  Mammo- 7/22  DEXA- 11/22  PAP- 2015- will see GYN  Lung CT-  AAA-  EKG-  Pneumovax- 2013  Prevnar-  Flu- 9/22  Shingrix-  Td-     OARRS:  Neil Louie, DO on 9/15/2023  4:04 PM  I have personally reviewed the OARRS report for Laxmi Sousa. I have considered the risks of abuse, dependence, addiction and diversion    Is the patient prescribed a combination of a benzodiazepine and opioid?  No    Last Urine Drug Screen / ordered today: Yes  No results found for this or any previous visit (from the past 8760 hour(s)).  Results are as expected.       Controlled Substance Agreement:  Date of the Last Agreement: 5/8/23  Reviewed Controlled Substance Agreement including but not limited to the benefits, risks, and alternatives to treatment with a Controlled Substance medication(s).    Opioids:  What is the patient's goal of therapy? Better function around  Is this being achieved with current treatment? Yes    I have calculated the patient's Morphine Dose Equivalent (MED):   I have considered referral to Pain Management and/or a specialist, and do not feel it is necessary at this time.    I feel that it is clinically indicated to continue this current medication regimen after consideration of alternative therapies, and other non-opioid  treatment.    Opioid Risk Screening:  Family History of Substance Abuse  Alcohol: 0 (2023  4:00 PM)  Illegal Dru (2023  4:00 PM)  Prescription Dru (2023  4:00 PM)    Personal History of Substance Abuse  Alcohol: 0 (2023  4:00 PM)  Illegal Drugs: 0 (2023  4:00 PM)  Prescription Drugs: 0 (2023  4:00 PM)    Patient Age (16-45)  Age (16-45): 0 (2023  4:00 PM)    History of Preadolescent Sexual Abuse  History of Preadolescent Sexual Abuse: 0 (2023  4:00 PM)    Psychological Disease  Attention Deficit Disorder, Obsessive Compulsive Disorder, Bipolar, Schizophrenia: 0 (2023  4:00 PM)  Depression: 1 (2023  4:00 PM)    Total Score  Total Score: 1 (2023  4:00 PM)    Total Score Risk Category  TOTAL SCORE CATEGORY: Low Risk (0-3) (2023  4:00 PM)        Pain Assessment:  Analgesia  What was your pain level on average during the past week?: 6  What was your pain level at its worst during the past week?: 8  What percentage of your pain has been relieved during the past week?: 100 %  Is the amount of pain relief you are now obtaining from your current pain relievers enough to make a real difference in your life?: Y  Query to Clinician: Is the patient's pain relief clinically significant?: Yes    Activities of Daily Living  Physical Functioning: Better  Family Relationships: Better  Social Relationships: Better  Mood: Better  Sleep Patterns: Better  Overall Functioning: Better    Adverse Events  Is patient experiencing any side effects from current pain relievers?: N  Patient's Overall Severity of Side Effects: None      Assessment  Is your overall impression that this patient is benefiting from opioid therapy?: Yes  Specific Analgesic Plan: Continue present regimen        Current Outpatient Medications:     loratadine (Claritin) 10 mg tablet, Take 1 tablet (10 mg) by mouth once daily., Disp: , Rfl:     albuterol 90 mcg/actuation inhaler, Inhale 2 puffs every 4 hours if needed  for shortness of breath., Disp: 18 g, Rfl: 3    anastrozole (Arimidex) 1 mg tablet, Take 1 tablet (1 mg total) by mouth once daily., Disp: , Rfl:     aspirin 81 mg EC tablet, Take 1 tablet (81 mg) by mouth once daily., Disp: , Rfl:     atorvastatin (Lipitor) 10 mg tablet, Take 1 tablet (10 mg) by mouth once daily., Disp: 90 tablet, Rfl: 3    camphor-methyl salicyl-menthoL (Bengay Ultra Strength) 4-30-10 % cream, Apply twice a day to hands, Disp: 113 g, Rfl: 3    Dexcom G6  misc, Inject 1 Device under the skin if needed., Disp: , Rfl:     Dexcom G6 Sensor device, CHANGE every 10 DAYS as directed, Disp: , Rfl:     Dexcom G6 Transmitter device, Inject under the skin if needed., Disp: , Rfl:     esomeprazole (NexIUM) 40 mg DR capsule, Take 1 capsule (40 mg) by mouth once daily in the morning. Take before meals., Disp: , Rfl:     famotidine (Pepcid) 40 mg tablet, TAKE 1 TABLET BY MOUTH EVERY DAY, Disp: 90 tablet, Rfl: 1    fluticasone (Flonase) 50 mcg/actuation nasal spray, Administer 2 sprays into each nostril once daily., Disp: 48 g, Rfl: 3    fluticasone propion-salmeteroL (Wixela Inhub) 250-50 mcg/dose diskus inhaler, Inhale 1 puff 2 times a day., Disp: 180 each, Rfl: 1    gabapentin (Neurontin) 800 mg tablet, Take 1 tablet (800 mg) by mouth 4 times a day., Disp: 360 tablet, Rfl: 1    HumaLOG U-100 Insulin 100 unit/mL injection, USE AS DIRECTED UP TO 80 UNITS DAILY IN INSULIN PUMP, Disp: , Rfl:     hydrOXYzine HCL (Atarax) 50 mg tablet, Take 1 tablet (50 mg) by mouth 4 times a day., Disp: , Rfl:     lipase-protease-amylase (Creon) 24,000-76,000 -120,000 unit capsule, 2 capsules with first meal and 1 capsule with first snack Orally, Disp: , Rfl:     lisinopril 2.5 mg tablet, Take 1 tablet (2.5 mg) by mouth once daily., Disp: , Rfl:     metoprolol succinate XL (Toprol-XL) 25 mg 24 hr tablet, Take 1 tablet (25 mg) by mouth once daily., Disp: , Rfl:     naloxone (Narcan) 4 mg/0.1 mL nasal spray, Administer 1  spray (4 mg) into affected nostril(s) if needed for opioid reversal. May repeat every 2-3 minutes if needed, alternating nostrils, until medical assistance becomes available., Disp: 2 each, Rfl: 1    ondansetron (Zofran) 4 mg tablet, Take 2 tablets (8 mg) by mouth every 12 hours if needed., Disp: , Rfl:     oxyCODONE (Roxicodone) 10 mg immediate release tablet, Take 1 tablet (10 mg) by mouth every 4 hours if needed for severe pain (7 - 10)., Disp: 150 tablet, Rfl: 0    pantoprazole (ProtoNix) 40 mg EC tablet, Take 1 tablet (40 mg) by mouth once daily in the morning. Take before meals., Disp: , Rfl:     traZODone (Desyrel) 50 mg tablet, TAKE 1-3 TABLETS BY MOUTH AT BEDTIME AS NEEDED FOR ANXIETY, Disp: 270 tablet, Rfl: 1    triamterene-hydrochlorothiazid (Maxzide-25) 37.5-25 mg tablet, Take 1 tablet by mouth once daily., Disp: , Rfl:    Past Surgical History:   Procedure Laterality Date    CHOLECYSTECTOMY  2013    Cholecystectomy Laparoscopic    LITHOTRIPSY  2013    Renal Lithotripsy    OTHER SURGICAL HISTORY  2013    Endoscopic Retrograde Cholangiopancreatography (ERCP)    OTHER SURGICAL HISTORY  2019     section    OTHER SURGICAL HISTORY  2014    ERCP With Change Of Tube/Stent    OTHER SURGICAL HISTORY  2015    Incision And Drainage Of Skin Abscess Buttocks    TONSILLECTOMY  2018    Tonsillectomy    VENTRAL HERNIA REPAIR  2018    Ventral Hernia Repair      Past Medical History:   Diagnosis Date    Acute actinic otitis externa, left ear 2020    Acute actinic otitis externa of left ear    Acute candidiasis of vulva and vagina 2016    Vaginitis due to Candida albicans    Acute mastoiditis without complications, left ear 2020    Acute mastoiditis of left side    Acute serous otitis media, left ear 2020    Non-recurrent acute serous otitis media of left ear    Acute suppurative otitis media without spontaneous rupture of ear drum, left ear  "08/17/2020    Non-recurrent acute suppurative otitis media of left ear without spontaneous rupture of tympanic membrane    Cutaneous abscess of buttock 04/08/2015    Abscess of buttock, right    Encounter for debridement of left postmastoidectomy cavity 04/04/2023    Enlarged lymph node 04/04/2023    Fatigue 04/04/2023    Other chronic nonsuppurative otitis media, left ear 05/03/2021    Other chronic nonsuppurative otitis media of left ear    Other chronic suppurative otitis media, left ear 11/06/2020    Chronic suppurative otitis media of left ear, unspecified otitis media location    Other obesity due to excess calories 03/31/2020    Class 1 obesity due to excess calories with serious comorbidity and body mass index (BMI) of 30.0 to 30.9 in adult    Personal history of other diseases of the digestive system     History of chronic pancreatitis    Personal history of other diseases of the respiratory system 02/24/2021    History of acute bronchitis    Personal history of other diseases of the respiratory system 01/23/2017    History of acute sinusitis    Personal history of other mental and behavioral disorders 07/07/2017    History of depression    Personal history of other specified conditions 08/28/2019    History of painful urination    Stress-induced cardiomyopathy 04/04/2023    Swimmer's ear, left ear 04/28/2020    Acute swimmer's ear of left side    Viral conjunctivitis, unspecified 07/16/2021    Acute viral conjunctivitis, unspecified laterality    Weak urinary stream 04/04/2023     Social History     Tobacco Use    Smoking status: Every Day     Packs/day: 1     Types: Cigarettes    Smokeless tobacco: Never   Substance Use Topics    Alcohol use: Never    Drug use: Never      No family history on file.   Review of Systems    Objective   /74   Pulse 62   Ht 1.575 m (5' 2\")   Wt 59.9 kg (132 lb)   SpO2 96%   BMI 24.14 kg/m²    Physical Exam  Vitals and nursing note reviewed.   Constitutional:       " General: She is not in acute distress.     Appearance: Normal appearance.   HENT:      Head: Normocephalic and atraumatic.      Right Ear: Tympanic membrane, ear canal and external ear normal.      Left Ear: Tympanic membrane, ear canal and external ear normal.      Nose: Nose normal.      Mouth/Throat:      Mouth: Mucous membranes are moist.      Pharynx: Oropharynx is clear.   Eyes:      Extraocular Movements: Extraocular movements intact.      Pupils: Pupils are equal, round, and reactive to light.   Neck:      Vascular: No carotid bruit.   Cardiovascular:      Rate and Rhythm: Normal rate and regular rhythm.      Pulses: Normal pulses.      Heart sounds: Normal heart sounds. No murmur heard.  Pulmonary:      Effort: Pulmonary effort is normal.      Breath sounds: Normal breath sounds.   Abdominal:      General: Abdomen is flat. Bowel sounds are normal.      Palpations: Abdomen is soft. There is no mass.      Tenderness: There is generalized abdominal tenderness.   Musculoskeletal:         General: Normal range of motion.      Cervical back: Normal range of motion and neck supple.   Lymphadenopathy:      Cervical: No cervical adenopathy.   Skin:     Capillary Refill: Capillary refill takes less than 2 seconds.   Neurological:      General: No focal deficit present.      Mental Status: She is alert and oriented to person, place, and time.      Cranial Nerves: No cranial nerve deficit.      Motor: No weakness.      Deep Tendon Reflexes: Reflexes normal.   Psychiatric:         Mood and Affect: Mood normal.         Behavior: Behavior normal.         Assessment/Plan   Problem List Items Addressed This Visit       Allergic rhinitis    Chronic pancreatitis (CMS/HCC)    MDD (major depressive disorder), recurrent episode, moderate (CMS/HCC)    Persistent microalbuminuria associated with type 1 diabetes mellitus (CMS/HCC)    Uncontrolled type 1 diabetes mellitus with hyperglycemia (CMS/HCC) - Primary    Vitamin B12  deficiency     Other Visit Diagnoses       Polyarthralgia        Exocrine pancreatic insufficiency        Opiate analgesic contract exists            Renewed/continued rest of medications     Updated Health Maintenance in HPI section     Hyperlipidemia- continue meds, low fat/cholesterol diet     Cardiomyopathy- f/u with cardiology     Neuropathy- gabapentin 900 4 times a day, control BS     MDD with anxiety- continue Effexor, Wellbutrin, hydroxyzine 50 mg prn     Chronic Abdominal pain/pancreatitis- continue oxycodone, increase gabapentin to 800 mg 4 times a day     DM Type 1 with neuropathy- continue meds, referred to endo, [facility]     Vitamin B12 def- check level, supplement     Microalbuminuria- lisinopril, control BS     Asthma- advair, albuterol prn, allergen avoidance     Breast Cancer- F/U with oncology    Exocrine Pancreatic Insufficiency- restart creon, limit fatty foods     F/U 3 months     Patient understands and agrees with treatment plan    Neil Louie, DO

## 2023-09-16 PROCEDURE — 80361 OPIATES 1 OR MORE: CPT

## 2023-09-16 PROCEDURE — 80365 DRUG SCREENING OXYCODONE: CPT

## 2023-09-18 DIAGNOSIS — N30.01 ACUTE CYSTITIS WITH HEMATURIA: Primary | ICD-10-CM

## 2023-09-18 RX ORDER — NITROFURANTOIN 25; 75 MG/1; MG/1
100 CAPSULE ORAL 2 TIMES DAILY
Qty: 10 CAPSULE | Refills: 0 | Status: SHIPPED | OUTPATIENT
Start: 2023-09-18 | End: 2023-09-23

## 2023-09-19 LAB
6-ACETYLMORPHINE: <25 NG/ML
CODEINE: <50 NG/ML
HYDROCODONE: <25 NG/ML
HYDROMORPHONE: <25 NG/ML
MORPHINE URINE: <50 NG/ML
NORHYDROCODONE: <25 NG/ML
NOROXYCODONE: >1000 NG/ML
OXYCODONE: 925 NG/ML
OXYMORPHONE: 534 NG/ML

## 2023-09-21 DIAGNOSIS — C50.411 MALIGNANT NEOPLASM OF UPPER-OUTER QUADRANT OF RIGHT BREAST IN FEMALE, ESTROGEN RECEPTOR POSITIVE (MULTI): Primary | ICD-10-CM

## 2023-09-21 DIAGNOSIS — Z17.0 MALIGNANT NEOPLASM OF UPPER-OUTER QUADRANT OF RIGHT BREAST IN FEMALE, ESTROGEN RECEPTOR POSITIVE (MULTI): Primary | ICD-10-CM

## 2023-09-21 PROBLEM — D05.11 DUCTAL CARCINOMA IN SITU (DCIS) OF RIGHT BREAST: Status: RESOLVED | Noted: 2023-04-04 | Resolved: 2023-09-21

## 2023-09-27 DIAGNOSIS — K86.0 ALCOHOL-INDUCED CHRONIC PANCREATITIS (MULTI): ICD-10-CM

## 2023-09-27 DIAGNOSIS — G89.29 CHRONIC ABDOMINAL PAIN: ICD-10-CM

## 2023-09-27 DIAGNOSIS — R10.9 CHRONIC ABDOMINAL PAIN: ICD-10-CM

## 2023-09-28 VITALS — WEIGHT: 132.28 LBS | BODY MASS INDEX: 23.44 KG/M2 | HEIGHT: 63 IN

## 2023-09-28 PROBLEM — C50.111 MALIGNANT NEOPLASM OF CENTRAL PORTION OF RIGHT BREAST IN FEMALE, ESTROGEN RECEPTOR POSITIVE (MULTI): Status: ACTIVE | Noted: 2023-09-28

## 2023-09-28 PROBLEM — Z17.0 MALIGNANT NEOPLASM OF CENTRAL PORTION OF RIGHT BREAST IN FEMALE, ESTROGEN RECEPTOR POSITIVE (MULTI): Status: ACTIVE | Noted: 2023-09-28

## 2023-09-29 DIAGNOSIS — K86.1 CHRONIC PANCREATITIS, UNSPECIFIED PANCREATITIS TYPE (MULTI): Primary | ICD-10-CM

## 2023-09-29 RX ORDER — ONDANSETRON 4 MG/1
8 TABLET, FILM COATED ORAL EVERY 12 HOURS PRN
Qty: 20 TABLET | Refills: 2 | Status: SHIPPED | OUTPATIENT
Start: 2023-09-29 | End: 2024-01-12 | Stop reason: SDUPTHER

## 2023-09-29 RX ORDER — OXYCODONE HYDROCHLORIDE 10 MG/1
10 TABLET ORAL EVERY 4 HOURS PRN
Qty: 150 TABLET | Refills: 0 | Status: SHIPPED | OUTPATIENT
Start: 2023-09-29 | End: 2023-10-25 | Stop reason: SDUPTHER

## 2023-10-04 ENCOUNTER — PHARMACY VISIT (OUTPATIENT)
Dept: PHARMACY | Facility: CLINIC | Age: 53
End: 2023-10-04
Payer: MEDICARE

## 2023-10-04 PROCEDURE — RXMED WILLOW AMBULATORY MEDICATION CHARGE

## 2023-10-11 DIAGNOSIS — J45.40 MODERATE PERSISTENT ASTHMA, UNSPECIFIED WHETHER COMPLICATED (HHS-HCC): ICD-10-CM

## 2023-10-11 DIAGNOSIS — E11.40 DIABETIC NEUROPATHY, PAINFUL (MULTI): ICD-10-CM

## 2023-10-11 RX ORDER — ALBUTEROL SULFATE 90 UG/1
2 AEROSOL, METERED RESPIRATORY (INHALATION) EVERY 4 HOURS PRN
Qty: 8 G | Refills: 1 | Status: SHIPPED | OUTPATIENT
Start: 2023-10-11 | End: 2023-11-27

## 2023-10-11 RX ORDER — GABAPENTIN 800 MG/1
800 TABLET ORAL 4 TIMES DAILY
Qty: 120 TABLET | Refills: 2 | Status: SHIPPED | OUTPATIENT
Start: 2023-10-11 | End: 2024-01-12 | Stop reason: SDUPTHER

## 2023-10-25 DIAGNOSIS — G89.29 CHRONIC ABDOMINAL PAIN: ICD-10-CM

## 2023-10-25 DIAGNOSIS — R10.9 CHRONIC ABDOMINAL PAIN: ICD-10-CM

## 2023-10-25 DIAGNOSIS — K86.0 ALCOHOL-INDUCED CHRONIC PANCREATITIS (MULTI): ICD-10-CM

## 2023-10-25 RX ORDER — OXYCODONE HYDROCHLORIDE 10 MG/1
10 TABLET ORAL EVERY 4 HOURS PRN
Qty: 150 TABLET | Refills: 0 | Status: SHIPPED | OUTPATIENT
Start: 2023-10-25 | End: 2023-10-30 | Stop reason: SDUPTHER

## 2023-10-30 DIAGNOSIS — G89.29 CHRONIC ABDOMINAL PAIN: ICD-10-CM

## 2023-10-30 DIAGNOSIS — K86.0 ALCOHOL-INDUCED CHRONIC PANCREATITIS (MULTI): ICD-10-CM

## 2023-10-30 DIAGNOSIS — R10.9 CHRONIC ABDOMINAL PAIN: ICD-10-CM

## 2023-10-30 RX ORDER — OXYCODONE HYDROCHLORIDE 10 MG/1
10 TABLET ORAL EVERY 4 HOURS PRN
Qty: 150 TABLET | Refills: 0 | Status: SHIPPED | OUTPATIENT
Start: 2023-10-30 | End: 2023-11-27 | Stop reason: SDUPTHER

## 2023-11-09 RX ORDER — ONDANSETRON HYDROCHLORIDE 2 MG/ML
4 INJECTION, SOLUTION INTRAVENOUS ONCE AS NEEDED
Status: CANCELLED | OUTPATIENT
Start: 2023-11-09

## 2023-11-09 RX ORDER — SODIUM CHLORIDE, SODIUM LACTATE, POTASSIUM CHLORIDE, CALCIUM CHLORIDE 600; 310; 30; 20 MG/100ML; MG/100ML; MG/100ML; MG/100ML
100 INJECTION, SOLUTION INTRAVENOUS CONTINUOUS
Status: CANCELLED | OUTPATIENT
Start: 2023-11-09

## 2023-11-09 RX ORDER — DROPERIDOL 2.5 MG/ML
0.62 INJECTION, SOLUTION INTRAMUSCULAR; INTRAVENOUS ONCE AS NEEDED
Status: CANCELLED | OUTPATIENT
Start: 2023-11-09

## 2023-11-10 ENCOUNTER — HOSPITAL ENCOUNTER (OUTPATIENT)
Dept: CARDIOLOGY | Facility: HOSPITAL | Age: 53
Discharge: HOME | End: 2023-11-10
Payer: MEDICARE

## 2023-11-10 ENCOUNTER — HOSPITAL ENCOUNTER (OUTPATIENT)
Facility: HOSPITAL | Age: 53
Setting detail: OUTPATIENT SURGERY
Discharge: HOME | End: 2023-11-10
Attending: UROLOGY | Admitting: UROLOGY
Payer: MEDICARE

## 2023-11-10 LAB — GLUCOSE BLD MANUAL STRIP-MCNC: 453 MG/DL (ref 74–99)

## 2023-11-10 PROCEDURE — 93010 ELECTROCARDIOGRAM REPORT: CPT | Performed by: INTERNAL MEDICINE

## 2023-11-10 PROCEDURE — 93005 ELECTROCARDIOGRAM TRACING: CPT

## 2023-11-10 PROCEDURE — 82947 ASSAY GLUCOSE BLOOD QUANT: CPT

## 2023-11-10 RX ORDER — SODIUM CHLORIDE, SODIUM LACTATE, POTASSIUM CHLORIDE, CALCIUM CHLORIDE 600; 310; 30; 20 MG/100ML; MG/100ML; MG/100ML; MG/100ML
100 INJECTION, SOLUTION INTRAVENOUS CONTINUOUS
Status: DISCONTINUED | OUTPATIENT
Start: 2023-11-10 | End: 2023-11-10 | Stop reason: HOSPADM

## 2023-11-24 DIAGNOSIS — G47.00 INSOMNIA, UNSPECIFIED: ICD-10-CM

## 2023-11-24 RX ORDER — TRAZODONE HYDROCHLORIDE 50 MG/1
TABLET ORAL
Qty: 180 TABLET | Refills: 1 | Status: SHIPPED | OUTPATIENT
Start: 2023-11-24 | End: 2024-03-13

## 2023-11-25 DIAGNOSIS — M25.50 POLYARTHRALGIA: ICD-10-CM

## 2023-11-26 DIAGNOSIS — J45.40 MODERATE PERSISTENT ASTHMA, UNSPECIFIED WHETHER COMPLICATED (HHS-HCC): ICD-10-CM

## 2023-11-27 DIAGNOSIS — K86.0 ALCOHOL-INDUCED CHRONIC PANCREATITIS (MULTI): ICD-10-CM

## 2023-11-27 DIAGNOSIS — R10.9 CHRONIC ABDOMINAL PAIN: ICD-10-CM

## 2023-11-27 DIAGNOSIS — G89.29 CHRONIC ABDOMINAL PAIN: ICD-10-CM

## 2023-11-27 RX ORDER — DICLOFENAC SODIUM 10 MG/G
2 GEL TOPICAL 4 TIMES DAILY PRN
Qty: 300 G | Refills: 1 | Status: SHIPPED | OUTPATIENT
Start: 2023-11-27 | End: 2024-01-29

## 2023-11-27 RX ORDER — ALBUTEROL SULFATE 90 UG/1
2 AEROSOL, METERED RESPIRATORY (INHALATION) EVERY 4 HOURS PRN
Qty: 18 G | Refills: 1 | Status: SHIPPED | OUTPATIENT
Start: 2023-11-27 | End: 2024-01-31 | Stop reason: SDUPTHER

## 2023-11-27 RX ORDER — OXYCODONE HYDROCHLORIDE 10 MG/1
10 TABLET ORAL EVERY 4 HOURS PRN
Qty: 150 TABLET | Refills: 0 | Status: SHIPPED | OUTPATIENT
Start: 2023-11-27 | End: 2024-01-02 | Stop reason: SDUPTHER

## 2023-12-06 ENCOUNTER — TELEPHONE (OUTPATIENT)
Dept: PRIMARY CARE | Facility: CLINIC | Age: 53
End: 2023-12-06
Payer: MEDICARE

## 2023-12-06 DIAGNOSIS — M19.049 LOCALIZED OSTEOARTHRITIS OF HAND, UNSPECIFIED LATERALITY: Primary | ICD-10-CM

## 2023-12-06 RX ORDER — DICLOFENAC SODIUM 75 MG/1
75 TABLET, DELAYED RELEASE ORAL 2 TIMES DAILY PRN
Qty: 60 TABLET | Refills: 0 | Status: SHIPPED | OUTPATIENT
Start: 2023-12-06 | End: 2024-01-02

## 2023-12-13 ENCOUNTER — ANESTHESIA EVENT (OUTPATIENT)
Dept: OPERATING ROOM | Facility: HOSPITAL | Age: 53
End: 2023-12-13
Payer: MEDICARE

## 2023-12-14 ENCOUNTER — TELEPHONE (OUTPATIENT)
Dept: PREADMISSION TESTING | Facility: HOSPITAL | Age: 53
End: 2023-12-14
Payer: MEDICARE

## 2023-12-14 RX ORDER — ACETAMINOPHEN 325 MG/1
650 TABLET ORAL EVERY 4 HOURS PRN
Status: CANCELLED | OUTPATIENT
Start: 2023-12-14

## 2023-12-14 RX ORDER — LABETALOL HYDROCHLORIDE 5 MG/ML
5 INJECTION, SOLUTION INTRAVENOUS ONCE AS NEEDED
Status: CANCELLED | OUTPATIENT
Start: 2023-12-14

## 2023-12-14 RX ORDER — SODIUM CHLORIDE, SODIUM LACTATE, POTASSIUM CHLORIDE, CALCIUM CHLORIDE 600; 310; 30; 20 MG/100ML; MG/100ML; MG/100ML; MG/100ML
100 INJECTION, SOLUTION INTRAVENOUS CONTINUOUS
Status: CANCELLED | OUTPATIENT
Start: 2023-12-14

## 2023-12-14 RX ORDER — LIDOCAINE HYDROCHLORIDE 10 MG/ML
0.1 INJECTION, SOLUTION EPIDURAL; INFILTRATION; INTRACAUDAL; PERINEURAL ONCE
Status: CANCELLED | OUTPATIENT
Start: 2023-12-14 | End: 2023-12-14

## 2023-12-14 RX ORDER — OXYCODONE HYDROCHLORIDE 5 MG/1
10 TABLET ORAL EVERY 4 HOURS PRN
Status: CANCELLED | OUTPATIENT
Start: 2023-12-14

## 2023-12-14 RX ORDER — OXYCODONE HYDROCHLORIDE 5 MG/1
5 TABLET ORAL EVERY 4 HOURS PRN
Status: CANCELLED | OUTPATIENT
Start: 2023-12-14

## 2023-12-14 NOTE — ANESTHESIA PREPROCEDURE EVALUATION
Patient: Laxmi Sousa    Procedure Information       Date/Time: 12/15/23 0850    Procedure: CYSTOSCOPY WITH DILATATION    Location: [facility] / [facility]    Surgeons: Danelle Warner MD          Vitals:    12/15/23 0640   BP: (!) 170/91   Pulse: 81   Resp: 16   Temp: 36.6 °C (97.9 °F)   SpO2: 98%       Past Surgical History:   Procedure Laterality Date    BREAST LUMPECTOMY W/ NEEDLE LOCALIZATION Right 2022    [place] lymph node excisional biopsy.    CHOLECYSTECTOMY  2013    Cholecystectomy Laparoscopic    CYSTOSCOPY      URETHRAL DILATION    LITHOTRIPSY  2013    Renal Lithotripsy    OTHER SURGICAL HISTORY  2013    Endoscopic Retrograde Cholangiopancreatography (ERCP)    OTHER SURGICAL HISTORY  2019     section    OTHER SURGICAL HISTORY  2014    ERCP With Change Of Tube/Stent    OTHER SURGICAL HISTORY  2015    Incision And Drainage Of Skin Abscess Buttocks    TONSILLECTOMY  2018    Tonsillectomy    VENTRAL HERNIA REPAIR  2018    Ventral Hernia Repair     Past Medical History:   Diagnosis Date    Acute actinic otitis externa, left ear 2020    Acute actinic otitis externa of left ear    Acute candidiasis of vulva and vagina 2016    Vaginitis due to Candida albicans    Acute mastoiditis without complications, left ear 2020    Acute mastoiditis of left side    Acute serous otitis media, left ear 2020    Non-recurrent acute serous otitis media of left ear    Acute suppurative otitis media without spontaneous rupture of ear drum, left ear 2020    Non-recurrent acute suppurative otitis media of left ear without spontaneous rupture of tympanic membrane    Anxiety and depression     Asthma     Chronic pancreatitis (CMS/HCC)     Cutaneous abscess of buttock 2015    Abscess of buttock, right    Diabetic neuropathy (CMS/HCC)     Diverticular disease of colon     DM type 1 (diabetes mellitus, type 1) (CMS/HCC)      Encounter for debridement of left postmastoidectomy cavity 04/04/2023    Enlarged lymph node 04/04/2023    Fatigue 04/04/2023    GERD (gastroesophageal reflux disease)     Hidradenitis suppurativa     History of lumpectomy of right breast 07/20/2022    History of right breast cancer     Ductal carcinoma in situ (DCIS)    LFT elevation     Other chronic nonsuppurative otitis media, left ear 05/03/2021    Other chronic nonsuppurative otitis media of left ear    Other chronic suppurative otitis media, left ear 11/06/2020    Chronic suppurative otitis media of left ear, unspecified otitis media location    Other obesity due to excess calories 03/31/2020    Class 1 obesity due to excess calories with serious comorbidity and body mass index (BMI) of 30.0 to 30.9 in adult    Personal history of other diseases of the digestive system     History of chronic pancreatitis    Personal history of other diseases of the respiratory system 02/24/2021    History of acute bronchitis    Personal history of other diseases of the respiratory system 01/23/2017    History of acute sinusitis    Personal history of other mental and behavioral disorders 07/07/2017    History of depression    Personal history of other specified conditions 08/28/2019    History of painful urination    Stress-induced cardiomyopathy 04/04/2023    Swimmer's ear, left ear 04/28/2020    Acute swimmer's ear of left side    Urethral stricture     Viral conjunctivitis, unspecified 07/16/2021    Acute viral conjunctivitis, unspecified laterality    Weak urinary stream 04/04/2023       Current Facility-Administered Medications:     ceFAZolin in dextrose (iso-os) (Ancef) IVPB 2 g, 2 g, intravenous, Once, Danelle Warner MD    lactated Ringer's infusion, 50 mL/hr, intravenous, Continuous, Danelle Warner MD, Last Rate: 50 mL/hr at 12/15/23 0723, 50 mL/hr at 12/15/23 0723  Prior to Admission medications    Medication Sig Start Date End Date Taking? Authorizing Provider    albuterol 90 mcg/actuation inhaler INHALE 2 PUFFS EVERY 4 HOURS AS NEEDED FOR COUGH AND WHEEZE. 11/27/23  Yes Neil Louie DO   anastrozole (Arimidex) 1 mg tablet Take 1 tablet (1 mg total) by mouth once daily. 11/3/22  Yes Historical Provider, MD   atorvastatin (Lipitor) 10 mg tablet Take 1 tablet (10 mg) by mouth once daily. 5/8/23 5/7/24 Yes Neil Louie DO   blood-glucose meter misc UAD 9/15/23  Yes Neil Louie DO   Dexcom G6 Sensor device CHANGE every 10 DAYS as directed 3/14/23  Yes Historical Provider, MD   Dexcom G6 Transmitter device Inject under the skin if needed. 3/14/23  Yes Historical Provider, MD   diclofenac (Voltaren) 75 mg EC tablet Take 1 tablet (75 mg) by mouth 2 times a day as needed (pain). Do not crush, chew, or split. 12/6/23 2/4/24 Yes Neil Louie DO   diclofenac sodium (Voltaren) 1 % gel gel APPLY 0.5 APPLICATIONS TOPICALLY 4 TIMES A DAY AS NEEDED (JOINT PAIN). 11/27/23  Yes Neil Louie DO   esomeprazole (NexIUM) 40 mg DR capsule Take 1 capsule (40 mg) by mouth once daily in the morning. Take before meals.   Yes Historical Provider, MD   famotidine (Pepcid) 40 mg tablet TAKE 1 TABLET BY MOUTH EVERY DAY 7/25/23  Yes Neil Louie DO   fluticasone (Flonase) 50 mcg/actuation nasal spray Administer 2 sprays into each nostril once daily. 4/26/23 4/25/24 Yes Neil Louie DO   gabapentin (Neurontin) 800 mg tablet Take 1 tablet (800 mg) by mouth 4 times a day. 10/11/23 1/9/24 Yes Neil Louie DO   hydrOXYzine HCL (Atarax) 50 mg tablet Take 1 tablet (50 mg) by mouth 4 times a day. 9/8/21  Yes Historical Provider, MD   insulin lispro (HumaLOG) 100 unit/mL injection INJECT AS DIRECTED UP TO 80 UNITS DAILY IN INSULIN PUMP 8/28/23 8/27/24 Yes Selvin Ruiz MD   lipase-protease-amylase (Creon) 24,000-76,000 -120,000 unit capsule 3 po ac and 2 po before snacks 9/15/23  Yes Neil Louie,    lisinopril 2.5 mg tablet Take 1 tablet (2.5 mg) by mouth once  daily. 12/10/16  Yes Historical Provider, MD   loratadine (Claritin) 10 mg tablet Take 1 tablet (10 mg) by mouth once daily. 9/15/23 9/14/24 Yes Neil Louie, DO   ondansetron (Zofran) 4 mg tablet Take 2 tablets (8 mg) by mouth every 12 hours if needed for vomiting or nausea. 9/29/23  Yes Neil Louie DO   oxyCODONE (Roxicodone) 10 mg immediate release tablet Take 1 tablet (10 mg) by mouth every 4 hours if needed for severe pain (7 - 10). 11/27/23 12/27/23 Yes Neil Louie DO   pantoprazole (ProtoNix) 40 mg EC tablet Take 1 tablet (40 mg) by mouth once daily in the morning. Take before meals. 5/14/20  Yes Historical Provider, MD   traZODone (Desyrel) 50 mg tablet TAKE 1-3 TABLETS BY MOUTH AT BEDTIME AS NEEDED FOR ANXIETY 11/24/23  Yes Neil Louie, DO   Dexcom G6  misc Inject 1 Device under the skin if needed. 7/7/22   Historical Provider, MD   fluticasone propion-salmeteroL (Wixela Inhub) 250-50 mcg/dose diskus inhaler Inhale 1 puff 2 times a day. 7/25/23 1/21/24  Neil Louie, DO   metoprolol succinate XL (Toprol-XL) 25 mg 24 hr tablet Take 1 tablet (25 mg) by mouth once daily. 11/21/20   Historical Provider, MD   naloxone (Narcan) 4 mg/0.1 mL nasal spray Administer 1 spray (4 mg) into affected nostril(s) if needed for opioid reversal. May repeat every 2-3 minutes if needed, alternating nostrils, until medical assistance becomes available. 8/28/23 8/27/24  Neil Louie DO   triamterene-hydrochlorothiazid (Maxzide-25) 37.5-25 mg tablet Take 1 tablet by mouth once daily. 9/17/21   Historical Provider, MD   aspirin 81 mg EC tablet Take 1 tablet (81 mg) by mouth once daily. 11/21/20 12/15/23  Historical Provider, MD   camphor-methyl salicyl-menthoL (Bengay Ultra Strength) 4-30-10 % cream Apply twice a day to hands 5/8/23 12/15/23  Neil Louie,    HumaLOG U-100 Insulin 100 unit/mL injection USE AS DIRECTED UP TO 80 UNITS DAILY IN INSULIN PUMP  12/15/23  Historical Provider, MD      Allergies   Allergen Reactions    Morphine Hives     Social History     Tobacco Use    Smoking status: Every Day     Packs/day: .5     Types: Cigarettes    Smokeless tobacco: Never   Substance Use Topics    Alcohol use: Never         Chemistry    Lab Results   Component Value Date/Time     (L) 09/18/2023 1438    K 5.1 09/18/2023 1438     09/18/2023 1438    CO2 22 09/18/2023 1438    BUN 20 09/18/2023 1438    CREATININE 0.76 09/18/2023 1438    Lab Results   Component Value Date/Time    CALCIUM 9.4 09/18/2023 1438    ALKPHOS 626 (H) 09/18/2023 1438     (H) 09/18/2023 1438    ALT 93 (H) 09/18/2023 1438    BILITOT 0.4 09/18/2023 1438          Lab Results   Component Value Date/Time    WBC 7.6 09/18/2023 1438    HGB 12.9 09/18/2023 1438    HCT 43.2 09/18/2023 1438     (L) 09/18/2023 1438     Lab Results   Component Value Date/Time    PROTIME 10.8 11/20/2020 0615    INR 0.9 11/20/2020 0615     No results found for this or any previous visit (from the past 4464 hour(s)).  No results found for this or any previous visit from the past 1095 days.      Relevant Problems   Cardiovascular   (+) Mitral regurgitation   (+) Tricuspid regurgitation      Endocrine   (+) Diabetic neuropathy, painful (CMS/HCC)   (+) Hyponatremia      GI   (+) Chronic pancreatitis (CMS/HCC)   (+) Gastroesophageal reflux disease      Neuro/Psych   (+) Generalized anxiety disorder   (+) MDD (major depressive disorder), recurrent episode, moderate (CMS/HCC)      Pulmonary   (+) Asthma      Musculoskeletal   (+) Localized osteoarthritis of hand      Infectious Disease   (+) Hidradenitis suppurativa       Clinical information reviewed:       Med Hx  Surg Hx            NPO Detail:  No data recorded     Physical Exam    Airway  Mallampati: II     Cardiovascular - normal exam     Dental    Pulmonary    Abdominal            Anesthesia Plan    ASA 3     general     The patient is a current smoker.  Patient was previously instructed  to abstain from smoking on day of procedure.  Patient did not smoke on day of procedure.  Education provided regarding risk of obstructive sleep apnea.  intravenous induction   Anesthetic plan and risks discussed with patient.    Plan discussed with CRNA.

## 2023-12-15 ENCOUNTER — ANESTHESIA (OUTPATIENT)
Dept: OPERATING ROOM | Facility: HOSPITAL | Age: 53
End: 2023-12-15
Payer: MEDICARE

## 2023-12-15 ENCOUNTER — HOSPITAL ENCOUNTER (OUTPATIENT)
Facility: HOSPITAL | Age: 53
Setting detail: OUTPATIENT SURGERY
Discharge: HOME | End: 2023-12-15
Attending: UROLOGY | Admitting: UROLOGY
Payer: MEDICARE

## 2023-12-15 VITALS
WEIGHT: 143.08 LBS | BODY MASS INDEX: 26.33 KG/M2 | SYSTOLIC BLOOD PRESSURE: 111 MMHG | HEIGHT: 62 IN | HEART RATE: 81 BPM | DIASTOLIC BLOOD PRESSURE: 63 MMHG | TEMPERATURE: 97.7 F | RESPIRATION RATE: 16 BRPM | OXYGEN SATURATION: 98 %

## 2023-12-15 LAB
GLUCOSE BLD MANUAL STRIP-MCNC: 119 MG/DL (ref 74–99)
GLUCOSE BLD MANUAL STRIP-MCNC: 162 MG/DL (ref 74–99)

## 2023-12-15 PROCEDURE — 2500000004 HC RX 250 GENERAL PHARMACY W/ HCPCS (ALT 636 FOR OP/ED): Performed by: UROLOGY

## 2023-12-15 PROCEDURE — 2500000004 HC RX 250 GENERAL PHARMACY W/ HCPCS (ALT 636 FOR OP/ED): Performed by: NURSE ANESTHETIST, CERTIFIED REGISTERED

## 2023-12-15 PROCEDURE — 7100000002 HC RECOVERY ROOM TIME - EACH INCREMENTAL 1 MINUTE: Performed by: UROLOGY

## 2023-12-15 PROCEDURE — 3600000008 HC OR TIME - EACH INCREMENTAL 1 MINUTE - PROCEDURE LEVEL THREE: Performed by: UROLOGY

## 2023-12-15 PROCEDURE — 7100000010 HC PHASE TWO TIME - EACH INCREMENTAL 1 MINUTE: Performed by: UROLOGY

## 2023-12-15 PROCEDURE — 2500000005 HC RX 250 GENERAL PHARMACY W/O HCPCS: Performed by: NURSE ANESTHETIST, CERTIFIED REGISTERED

## 2023-12-15 PROCEDURE — 7100000009 HC PHASE TWO TIME - INITIAL BASE CHARGE: Performed by: UROLOGY

## 2023-12-15 PROCEDURE — A52281 PR CYSTOSCOPY,DIL URETHRAL STRICTURE: Performed by: NURSE ANESTHETIST, CERTIFIED REGISTERED

## 2023-12-15 PROCEDURE — 7100000001 HC RECOVERY ROOM TIME - INITIAL BASE CHARGE: Performed by: UROLOGY

## 2023-12-15 PROCEDURE — 3700000001 HC GENERAL ANESTHESIA TIME - INITIAL BASE CHARGE: Performed by: UROLOGY

## 2023-12-15 PROCEDURE — 3600000003 HC OR TIME - INITIAL BASE CHARGE - PROCEDURE LEVEL THREE: Performed by: UROLOGY

## 2023-12-15 PROCEDURE — 3700000002 HC GENERAL ANESTHESIA TIME - EACH INCREMENTAL 1 MINUTE: Performed by: UROLOGY

## 2023-12-15 PROCEDURE — 82947 ASSAY GLUCOSE BLOOD QUANT: CPT

## 2023-12-15 PROCEDURE — A4217 STERILE WATER/SALINE, 500 ML: HCPCS | Performed by: UROLOGY

## 2023-12-15 RX ORDER — SODIUM CHLORIDE, SODIUM LACTATE, POTASSIUM CHLORIDE, CALCIUM CHLORIDE 600; 310; 30; 20 MG/100ML; MG/100ML; MG/100ML; MG/100ML
50 INJECTION, SOLUTION INTRAVENOUS CONTINUOUS
Status: DISCONTINUED | OUTPATIENT
Start: 2023-12-15 | End: 2023-12-15 | Stop reason: HOSPADM

## 2023-12-15 RX ORDER — PHENYLEPHRINE HCL IN 0.9% NACL 0.4MG/10ML
SYRINGE (ML) INTRAVENOUS AS NEEDED
Status: DISCONTINUED | OUTPATIENT
Start: 2023-12-15 | End: 2023-12-15

## 2023-12-15 RX ORDER — FENTANYL CITRATE 50 UG/ML
INJECTION, SOLUTION INTRAMUSCULAR; INTRAVENOUS AS NEEDED
Status: DISCONTINUED | OUTPATIENT
Start: 2023-12-15 | End: 2023-12-15

## 2023-12-15 RX ORDER — CEFAZOLIN 1 G/1
INJECTION, POWDER, FOR SOLUTION INTRAVENOUS AS NEEDED
Status: DISCONTINUED | OUTPATIENT
Start: 2023-12-15 | End: 2023-12-15

## 2023-12-15 RX ORDER — ONDANSETRON HYDROCHLORIDE 2 MG/ML
INJECTION, SOLUTION INTRAVENOUS AS NEEDED
Status: DISCONTINUED | OUTPATIENT
Start: 2023-12-15 | End: 2023-12-15

## 2023-12-15 RX ORDER — WATER 1 ML/ML
IRRIGANT IRRIGATION AS NEEDED
Status: DISCONTINUED | OUTPATIENT
Start: 2023-12-15 | End: 2023-12-15 | Stop reason: HOSPADM

## 2023-12-15 RX ORDER — CEFAZOLIN SODIUM 2 G/100ML
2 INJECTION, SOLUTION INTRAVENOUS ONCE
Status: DISCONTINUED | OUTPATIENT
Start: 2023-12-15 | End: 2023-12-15 | Stop reason: HOSPADM

## 2023-12-15 RX ORDER — LIDOCAINE HCL/PF 100 MG/5ML
SYRINGE (ML) INTRAVENOUS AS NEEDED
Status: DISCONTINUED | OUTPATIENT
Start: 2023-12-15 | End: 2023-12-15

## 2023-12-15 RX ORDER — PROPOFOL 10 MG/ML
INJECTION, EMULSION INTRAVENOUS AS NEEDED
Status: DISCONTINUED | OUTPATIENT
Start: 2023-12-15 | End: 2023-12-15

## 2023-12-15 RX ORDER — SODIUM CHLORIDE, SODIUM LACTATE, POTASSIUM CHLORIDE, CALCIUM CHLORIDE 600; 310; 30; 20 MG/100ML; MG/100ML; MG/100ML; MG/100ML
100 INJECTION, SOLUTION INTRAVENOUS CONTINUOUS
Status: DISCONTINUED | OUTPATIENT
Start: 2023-12-15 | End: 2023-12-15

## 2023-12-15 RX ORDER — MIDAZOLAM HYDROCHLORIDE 1 MG/ML
INJECTION INTRAMUSCULAR; INTRAVENOUS AS NEEDED
Status: DISCONTINUED | OUTPATIENT
Start: 2023-12-15 | End: 2023-12-15

## 2023-12-15 RX ADMIN — FENTANYL CITRATE 25 MCG: 50 INJECTION, SOLUTION INTRAMUSCULAR; INTRAVENOUS at 08:29

## 2023-12-15 RX ADMIN — SODIUM CHLORIDE, POTASSIUM CHLORIDE, SODIUM LACTATE AND CALCIUM CHLORIDE 50 ML/HR: 600; 310; 30; 20 INJECTION, SOLUTION INTRAVENOUS at 07:23

## 2023-12-15 RX ADMIN — PROPOFOL 150 MG: 10 INJECTION, EMULSION INTRAVENOUS at 08:22

## 2023-12-15 RX ADMIN — LIDOCAINE HYDROCHLORIDE 50 MG: 20 INJECTION INTRAVENOUS at 08:22

## 2023-12-15 RX ADMIN — CEFAZOLIN 2 G: 330 INJECTION, POWDER, FOR SOLUTION INTRAMUSCULAR; INTRAVENOUS at 08:22

## 2023-12-15 RX ADMIN — MIDAZOLAM HYDROCHLORIDE 2 MG: 1 INJECTION, SOLUTION INTRAMUSCULAR; INTRAVENOUS at 08:15

## 2023-12-15 RX ADMIN — FENTANYL CITRATE 50 MCG: 50 INJECTION, SOLUTION INTRAMUSCULAR; INTRAVENOUS at 08:27

## 2023-12-15 RX ADMIN — ONDANSETRON 4 MG: 2 INJECTION INTRAMUSCULAR; INTRAVENOUS at 08:37

## 2023-12-15 RX ADMIN — Medication 120 MCG: at 08:46

## 2023-12-15 SDOH — HEALTH STABILITY: MENTAL HEALTH: CURRENT SMOKER: 1

## 2023-12-15 ASSESSMENT — PAIN - FUNCTIONAL ASSESSMENT
PAIN_FUNCTIONAL_ASSESSMENT: 0-10
PAIN_FUNCTIONAL_ASSESSMENT: UNABLE TO SELF-REPORT

## 2023-12-15 ASSESSMENT — PAIN SCALES - GENERAL: PAINLEVEL_OUTOF10: 0 - NO PAIN

## 2023-12-15 NOTE — OP NOTE
CYSTOSCOPY WITH DILATATION Operative Note     Date: 12/15/2023  OR Location: [facility] OR    Name: Laxmi Sousa, : 1970, Age: 53 y.o., MRN: [id number], Sex: female    Diagnosis  Pre-op Diagnosis     * Stricture of female urethra, unspecified stricture type [N35.92] Post-op Diagnosis     * Stricture of female urethra, unspecified stricture type [N35.92]     Procedures  CYSTOSCOPY WITH DILATATION  07926 - [place] CYSTO CALIBRATION DILAT URTL STRIX/STENOSIS      Surgeons      * Danelle Warner - Primary    Resident/Fellow/Other Assistant:  Surgeon(s) and Role:    Procedure Summary  Anesthesia: Consult  ASA: III  Anesthesia Staff: CRNA: CARITO Turner-CRNA  Estimated Blood Loss: 3mL  Intra-op Medications:   Medication Name Total Dose   sterile water irrigation solution 500 mL   lactated Ringer's infusion 466.67 mL              Anesthesia Record               Intraprocedure I/O Totals       None           Specimen: No specimens collected     Staff:   Scrub Person: Felecia Henry RN         Drains and/or Catheters: * None in log *    Tourniquet Times:         Implants:     Findings: very tight stenosis of urethra    Indications: Laxmi Sousa is an 53 y.o. female who is having surgery for URETHRAL STRICTURE N35.92.      The patient was seen in the preoperative area. The risks, benefits, complications, treatment options, non-operative alternatives, expected recovery and outcomes were discussed with the patient. The possibilities of reaction to medication, pulmonary aspiration, injury to surrounding structures, bleeding, recurrent infection, the need for additional procedures, failure to diagnose a condition, and creating a complication requiring transfusion or operation were discussed with the patient. The patient concurred with the proposed plan, giving informed consent.  The site of surgery was properly noted/marked if necessary per policy. The patient has been actively warmed in preoperative area.  Preoperative antibiotics have been ordered and given within 1 hours of incision. Venous thrombosis prophylaxis have been ordered including bilateral sequential compression devices    Procedure Details: After induction of anesthesia and time out, pt placed lithotomy postion and prepped and draped in normal fashion.  An 8 Malawian sound was gently place through a very tight urethra, and then using gentle pressure, continued dilation to 24 Malawian.  The pt then had placement of a 22 Malawian cystoscope to evaluate the bladder in entirety and no abnormalities were identified,  Both ureters were normal.  She was then awakened at conclusion and returned to pacu  Complications:  None; patient tolerated the procedure well.    Disposition: PACU - hemodynamically stable.  Condition: stable         Additional Details:      Attending Attestation: I performed the procedure.    Danelle Warner  Phone Number: [phone]

## 2023-12-15 NOTE — ANESTHESIA PROCEDURE NOTES
Airway  Date/Time: 12/15/2023 8:23 AM  Urgency: elective    Airway not difficult    Staffing  Performed: CRNA   Authorized by: CARITO Turner-CRNA    Performed by: CARITO Turner-CLAIRE  Patient location during procedure: OR    Indications and Patient Condition  Indications for airway management: anesthesia  Spontaneous Ventilation: absent  Sedation level: deep  Preoxygenated: yes  Mask difficulty assessment: 0 - not attempted    Final Airway Details  Final airway type: supraglottic airway      Successful airway: Supraglottic airway: iGel.  Size 3     Number of attempts at approach: 1

## 2023-12-15 NOTE — DISCHARGE SUMMARY
Discharge Diagnosis  Urethral stricture    Issues Requiring Follow-Up  none    Test Results Pending At Discharge  Pending Labs       No current pending labs.            Hospital Course   Tolerated well    Pertinent Physical Exam At Time of Discharge  Physical Exam    Home Medications     Medication List      CONTINUE taking these medications     albuterol 90 mcg/actuation inhaler; INHALE 2 PUFFS EVERY 4 HOURS AS   NEEDED FOR COUGH AND WHEEZE.   anastrozole 1 mg tablet; Commonly known as: Arimidex   atorvastatin 10 mg tablet; Commonly known as: Lipitor; Take 1 tablet (10   mg) by mouth once daily.   blood-glucose meter misc; UAD   Creon 24,000-76,000 -120,000 unit capsule; Generic drug:   lipase-protease-amylase; 3 po ac and 2 po before snacks   Dexcom G6  misc; Generic drug: Dexcom G4 platinum    Dexcom G6 Sensor device; Generic drug: blood-glucose sensor   Dexcom G6 Transmitter device; Generic drug: Dexcom G4 platinum   transmitter   * diclofenac sodium 1 % gel gel; Commonly known as: Voltaren; APPLY 0.5   APPLICATIONS TOPICALLY 4 TIMES A DAY AS NEEDED (JOINT PAIN).   * diclofenac 75 mg EC tablet; Commonly known as: Voltaren; Take 1 tablet   (75 mg) by mouth 2 times a day as needed (pain). Do not crush, chew, or   split.   esomeprazole 40 mg DR capsule; Commonly known as: NexIUM   famotidine 40 mg tablet; Commonly known as: Pepcid; TAKE 1 TABLET BY   MOUTH EVERY DAY   fluticasone 50 mcg/actuation nasal spray; Commonly known as: Flonase;   Administer 2 sprays into each nostril once daily.   fluticasone propion-salmeteroL 250-50 mcg/dose diskus inhaler; Commonly   known as: Wixela Inhub; Inhale 1 puff 2 times a day.   gabapentin 800 mg tablet; Commonly known as: Neurontin; Take 1 tablet   (800 mg) by mouth 4 times a day.   HumaLOG U-100 Insulin 100 unit/mL injection; Generic drug: insulin   lispro; INJECT AS DIRECTED UP TO 80 UNITS DAILY IN INSULIN PUMP   hydrOXYzine HCL 50 mg tablet; Commonly known  Intubation    Date/Time: 8/18/2022 9:10 AM  Performed by: Sherri Hargrove CRNA  Authorized by: Stuart Haynes MD     Intubation:     Induction:  Intravenous    Intubated:  Postinduction    Mask Ventilation:  N/a    Attempts:  1    Attempted By:  CRNA    Difficult Airway Encountered?: No      Complications:  None    Airway Device:  Supraglottic airway/LMA    Airway Device Size:  4.5    Style/Cuff Inflation:  Cuffed    Secured at:  The lips    Placement Verified By:  Capnometry    Complicating Factors:  None    Findings Post-Intubation:  BS equal bilateral and atraumatic/condition of teeth unchanged     as: Atarax   lisinopril 2.5 mg tablet   loratadine 10 mg tablet; Commonly known as: Claritin; Take 1 tablet (10   mg) by mouth once daily.   metoprolol succinate XL 25 mg 24 hr tablet; Commonly known as: Toprol-XL   naloxone 4 mg/0.1 mL nasal spray; Commonly known as: Narcan; Administer   1 spray (4 mg) into affected nostril(s) if needed for opioid reversal. May   repeat every 2-3 minutes if needed, alternating nostrils, until medical   assistance becomes available.   ondansetron 4 mg tablet; Commonly known as: Zofran; Take 2 tablets (8   mg) by mouth every 12 hours if needed for vomiting or nausea.   oxyCODONE 10 mg immediate release tablet; Commonly known as: Roxicodone;   Take 1 tablet (10 mg) by mouth every 4 hours if needed for severe pain (7   - 10).   pantoprazole 40 mg EC tablet; Commonly known as: ProtoNix   traZODone 50 mg tablet; Commonly known as: Desyrel; TAKE 1-3 TABLETS BY   MOUTH AT BEDTIME AS NEEDED FOR ANXIETY   triamterene-hydrochlorothiazid 37.5-25 mg tablet; Commonly known as:   Maxzide-25  * This list has 2 medication(s) that are the same as other medications   prescribed for you. Read the directions carefully, and ask your doctor or   other care provider to review them with you.       Outpatient Follow-Up  Future Appointments   Date Time Provider Department Center   12/18/2023  1:15 PM Meeta Douglas, Kesha [id number] [facility]   1/12/2024  4:00 PM Neil Louie DO [id number] [facility]   3/18/2024  2:30 PM Kelly Guerrier PA-C [id number] [facility]     Call office [phone] 3-4 weeks for   Danelle Warner MD

## 2023-12-15 NOTE — H&P
History Of Present Illness  Laxmi Sousa is a 53 y.o. female presenting with urethral stricture.     Past Medical History  Past Medical History:   Diagnosis Date    Acute actinic otitis externa, left ear 08/17/2020    Acute actinic otitis externa of left ear    Acute candidiasis of vulva and vagina 04/06/2016    Vaginitis due to Candida albicans    Acute mastoiditis without complications, left ear 08/21/2020    Acute mastoiditis of left side    Acute serous otitis media, left ear 08/17/2020    Non-recurrent acute serous otitis media of left ear    Acute suppurative otitis media without spontaneous rupture of ear drum, left ear 08/17/2020    Non-recurrent acute suppurative otitis media of left ear without spontaneous rupture of tympanic membrane    Anxiety and depression     Asthma     Chronic pancreatitis (CMS/Formerly [facility])     Cutaneous abscess of buttock 04/08/2015    Abscess of buttock, right    Diabetic neuropathy (CMS/[facility]ly [facility])     Diverticular disease of colon     DM type 1 (diabetes mellitus, type 1) (CMS/Formerly [facility])     Encounter for debridement of left postmastoidectomy cavity 04/04/2023    Enlarged lymph node 04/04/2023    Fatigue 04/04/2023    GERD (gastroesophageal reflux disease)     Hidradenitis suppurativa     History of lumpectomy of right breast 07/20/2022    History of right breast cancer     Ductal carcinoma in situ (DCIS)    LFT elevation     Other chronic nonsuppurative otitis media, left ear 05/03/2021    Other chronic nonsuppurative otitis media of left ear    Other chronic suppurative otitis media, left ear 11/06/2020    Chronic suppurative otitis media of left ear, unspecified otitis media location    Other obesity due to excess calories 03/31/2020    Class 1 obesity due to excess calories with serious comorbidity and body mass index (BMI) of 30.0 to 30.9 in adult    Personal history of other diseases of the digestive system     History of chronic pancreatitis    Personal history of other diseases of the  respiratory system 2021    History of acute bronchitis    Personal history of other diseases of the respiratory system 2017    History of acute sinusitis    Personal history of other mental and behavioral disorders 2017    History of depression    Personal history of other specified conditions 2019    History of painful urination    Stress-induced cardiomyopathy 2023    Swimmer's ear, left ear 2020    Acute swimmer's ear of left side    Urethral stricture     Viral conjunctivitis, unspecified 2021    Acute viral conjunctivitis, unspecified laterality    Weak urinary stream 2023       Surgical History  Past Surgical History:   Procedure Laterality Date    BREAST LUMPECTOMY W/ NEEDLE LOCALIZATION Right 2022    [place] lymph node excisional biopsy.    CHOLECYSTECTOMY  2013    Cholecystectomy Laparoscopic    CYSTOSCOPY      URETHRAL DILATION    LITHOTRIPSY  2013    Renal Lithotripsy    OTHER SURGICAL HISTORY  2013    Endoscopic Retrograde Cholangiopancreatography (ERCP)    OTHER SURGICAL HISTORY  2019     section    OTHER SURGICAL HISTORY  2014    ERCP With Change Of Tube/Stent    OTHER SURGICAL HISTORY  2015    Incision And Drainage Of Skin Abscess Buttocks    TONSILLECTOMY  2018    Tonsillectomy    VENTRAL HERNIA REPAIR  2018    Ventral Hernia Repair        Social History  She reports that she has been smoking cigarettes. She has been smoking an average of .5 packs per day. She has never used smokeless tobacco. She reports that she does not drink alcohol and does not use drugs.    Family History  No family history on file.     Allergies  Morphine    Review of Systems   All other systems reviewed and are negative.       Physical Exam  Constitutional:       Appearance: Normal appearance.   HENT:      Head: Normocephalic.      Mouth/Throat:      Mouth: Mucous membranes are moist.   Pulmonary:      Effort:  "Pulmonary effort is normal.   Musculoskeletal:         General: Normal range of motion.   Skin:     General: Skin is warm.   Neurological:      Mental Status: She is alert.          Last Recorded Vitals  Blood pressure (!) 170/91, pulse 81, temperature 36.6 °C (97.9 °F), resp. rate 16, height 1.575 m (5' 2\"), weight 64.9 kg (143 lb 1.3 oz), SpO2 98 %.    Relevant Results              Assessment/Plan   Active Problems:  There are no active Hospital Problems.      Urethral stricture, for cysto and dilation       I spent   minutes in the professional and overall care of this patient.      Danelle Warner MD    "

## 2023-12-18 ENCOUNTER — TELEPHONE (OUTPATIENT)
Dept: ENDOCRINOLOGY | Facility: CLINIC | Age: 53
End: 2023-12-18

## 2023-12-18 ENCOUNTER — APPOINTMENT (OUTPATIENT)
Dept: ENDOCRINOLOGY | Facility: CLINIC | Age: 53
End: 2023-12-18
Payer: MEDICARE

## 2023-12-18 NOTE — TELEPHONE ENCOUNTER
Laxmi Sousa   1970   [id number]   [phone]       Called patient but unable to leave voice message Adue to mailbox is full in regards to canceling today's virtual appt due to provider is not doing at this time. Called and left message on spouse phone to advise patient to call office because provider has an in person 215pm appt today that we can schedule her for or she can reschedule to convenient for her.

## 2023-12-21 ASSESSMENT — PAIN SCALES - GENERAL: PAIN_LEVEL: 2

## 2023-12-22 NOTE — ANESTHESIA POSTPROCEDURE EVALUATION
Patient: Laxmi Sousa    Procedure Summary       Date: 12/15/23 Room / Location: [facility] OR 03 / Virtual [facility] OR    Anesthesia Start: 0812 Anesthesia Stop: 0850    Procedure: CYSTOSCOPY WITH DILATATION Diagnosis:       Stricture of female urethra, unspecified stricture type      (URETHRAL STRICTURE N35.92)    Surgeons: Danelle Warner MD Responsible Provider: KENJI Turner    Anesthesia Type: general ASA Status: 3            Anesthesia Type: general    Vitals Value Taken Time   /59 12/15/23 0920   Temp 36.5 °C (97.7 °F) 12/15/23 0840   Pulse 81 12/15/23 0920   Resp 16 12/15/23 0920   SpO2 98 % 12/15/23 0920       Anesthesia Post Evaluation    Patient location during evaluation: PACU  Patient participation: complete - patient participated  Level of consciousness: awake  Pain score: 2  Pain management: adequate  Multimodal analgesia pain management approach  Airway patency: patent  Two or more strategies used to mitigate risk of obstructive sleep apnea  Cardiovascular status: acceptable  Respiratory status: acceptable  Hydration status: acceptable  Postoperative Nausea and Vomiting: none    No notable events documented.

## 2023-12-22 NOTE — ADDENDUM NOTE
Addendum  created 12/21/23 2152 by Steve Huertas MD    Clinical Note Signed, Review and Sign - Ready for Procedure

## 2023-12-28 ENCOUNTER — DOCUMENTATION (OUTPATIENT)
Dept: HEMATOLOGY/ONCOLOGY | Facility: CLINIC | Age: 53
End: 2023-12-28
Payer: MEDICARE

## 2023-12-28 DIAGNOSIS — R92.8 ABNORMALITY OF RIGHT BREAST ON SCREENING MAMMOGRAM: Primary | ICD-10-CM

## 2023-12-28 NOTE — PROGRESS NOTES
Mailbox is full and cannot leave messages at this time    Advised patient at OV in Sept that I would call her to follow up bone scan, labs and planned cystoscopy.    Bone scan ordered 9/28 has not been completed.  Would ask if she is still having bone pain?    She was having hot flashes and Rx Effexor completed, is she having problematic hot flashes and how can we help with that?    Labs done showed increased LFT and would repeat those labs at this time as well as repeat CA 27 29 which was marginally high (not worrisome, but worth repeating)    I reviewed cystoscopy results and appears everything went well and no suspcious findings of bladder.    Kelly Guerrier PA-C

## 2024-01-02 DIAGNOSIS — G89.29 CHRONIC ABDOMINAL PAIN: ICD-10-CM

## 2024-01-02 DIAGNOSIS — R10.9 CHRONIC ABDOMINAL PAIN: ICD-10-CM

## 2024-01-02 DIAGNOSIS — M19.049 LOCALIZED OSTEOARTHRITIS OF HAND, UNSPECIFIED LATERALITY: ICD-10-CM

## 2024-01-02 DIAGNOSIS — K86.0 ALCOHOL-INDUCED CHRONIC PANCREATITIS (MULTI): ICD-10-CM

## 2024-01-02 RX ORDER — DICLOFENAC SODIUM 75 MG/1
75 TABLET, DELAYED RELEASE ORAL 2 TIMES DAILY PRN
Qty: 60 TABLET | Refills: 0 | Status: SHIPPED | OUTPATIENT
Start: 2024-01-02 | End: 2024-01-12

## 2024-01-03 RX ORDER — OXYCODONE HYDROCHLORIDE 10 MG/1
10 TABLET ORAL EVERY 4 HOURS PRN
Qty: 150 TABLET | Refills: 0 | Status: SHIPPED | OUTPATIENT
Start: 2024-01-03 | End: 2024-01-31 | Stop reason: SDUPTHER

## 2024-01-04 ENCOUNTER — APPOINTMENT (OUTPATIENT)
Dept: ENDOCRINOLOGY | Facility: CLINIC | Age: 54
End: 2024-01-04
Payer: MEDICARE

## 2024-01-12 ENCOUNTER — OFFICE VISIT (OUTPATIENT)
Dept: PRIMARY CARE | Facility: CLINIC | Age: 54
End: 2024-01-12
Payer: MEDICARE

## 2024-01-12 VITALS
WEIGHT: 141.6 LBS | OXYGEN SATURATION: 96 % | DIASTOLIC BLOOD PRESSURE: 82 MMHG | SYSTOLIC BLOOD PRESSURE: 120 MMHG | HEART RATE: 108 BPM | BODY MASS INDEX: 25.9 KG/M2

## 2024-01-12 DIAGNOSIS — K86.1 CHRONIC PANCREATITIS, UNSPECIFIED PANCREATITIS TYPE (MULTI): ICD-10-CM

## 2024-01-12 DIAGNOSIS — E11.40 DIABETIC NEUROPATHY, PAINFUL (MULTI): ICD-10-CM

## 2024-01-12 DIAGNOSIS — E10.65 UNCONTROLLED TYPE 1 DIABETES MELLITUS WITH HYPERGLYCEMIA (MULTI): ICD-10-CM

## 2024-01-12 DIAGNOSIS — R80.9: ICD-10-CM

## 2024-01-12 DIAGNOSIS — Z17.0 MALIGNANT NEOPLASM OF CENTRAL PORTION OF RIGHT BREAST IN FEMALE, ESTROGEN RECEPTOR POSITIVE (MULTI): ICD-10-CM

## 2024-01-12 DIAGNOSIS — Z13.89 ENCOUNTER FOR SCREENING FOR OTHER DISORDER: ICD-10-CM

## 2024-01-12 DIAGNOSIS — M25.531 RIGHT WRIST PAIN: ICD-10-CM

## 2024-01-12 DIAGNOSIS — F33.1 MDD (MAJOR DEPRESSIVE DISORDER), RECURRENT EPISODE, MODERATE (MULTI): ICD-10-CM

## 2024-01-12 DIAGNOSIS — C50.111 MALIGNANT NEOPLASM OF CENTRAL PORTION OF RIGHT BREAST IN FEMALE, ESTROGEN RECEPTOR POSITIVE (MULTI): ICD-10-CM

## 2024-01-12 DIAGNOSIS — K86.1 IDIOPATHIC CHRONIC PANCREATITIS (MULTI): ICD-10-CM

## 2024-01-12 DIAGNOSIS — E10.29: ICD-10-CM

## 2024-01-12 DIAGNOSIS — Z00.00 ROUTINE GENERAL MEDICAL EXAMINATION AT HEALTH CARE FACILITY: Primary | ICD-10-CM

## 2024-01-12 DIAGNOSIS — J30.1 NON-SEASONAL ALLERGIC RHINITIS DUE TO POLLEN: ICD-10-CM

## 2024-01-12 PROCEDURE — 99396 PREV VISIT EST AGE 40-64: CPT | Performed by: FAMILY MEDICINE

## 2024-01-12 PROCEDURE — 3079F DIAST BP 80-89 MM HG: CPT | Performed by: FAMILY MEDICINE

## 2024-01-12 PROCEDURE — G0442 ANNUAL ALCOHOL SCREEN 15 MIN: HCPCS | Performed by: FAMILY MEDICINE

## 2024-01-12 PROCEDURE — 4010F ACE/ARB THERAPY RXD/TAKEN: CPT | Performed by: FAMILY MEDICINE

## 2024-01-12 PROCEDURE — G0439 PPPS, SUBSEQ VISIT: HCPCS | Performed by: FAMILY MEDICINE

## 2024-01-12 PROCEDURE — 3074F SYST BP LT 130 MM HG: CPT | Performed by: FAMILY MEDICINE

## 2024-01-12 PROCEDURE — 99214 OFFICE O/P EST MOD 30 MIN: CPT | Performed by: FAMILY MEDICINE

## 2024-01-12 RX ORDER — LORATADINE 10 MG/1
10 TABLET ORAL DAILY
Qty: 90 TABLET | Refills: 3 | Status: SHIPPED | OUTPATIENT
Start: 2024-01-12 | End: 2025-01-11

## 2024-01-12 RX ORDER — ONDANSETRON 4 MG/1
8 TABLET, FILM COATED ORAL EVERY 12 HOURS PRN
Qty: 20 TABLET | Refills: 2 | Status: SHIPPED | OUTPATIENT
Start: 2024-01-12

## 2024-01-12 RX ORDER — FLUTICASONE PROPIONATE 50 MCG
2 SPRAY, SUSPENSION (ML) NASAL DAILY
Qty: 48 G | Refills: 3 | Status: SHIPPED | OUTPATIENT
Start: 2024-01-12 | End: 2025-01-11

## 2024-01-12 RX ORDER — ATORVASTATIN CALCIUM 10 MG/1
10 TABLET, FILM COATED ORAL DAILY
Qty: 90 TABLET | Refills: 3 | Status: SHIPPED | OUTPATIENT
Start: 2024-01-12 | End: 2025-01-11

## 2024-01-12 RX ORDER — LISINOPRIL 2.5 MG/1
2.5 TABLET ORAL DAILY
Qty: 90 TABLET | Refills: 3 | Status: SHIPPED | OUTPATIENT
Start: 2024-01-12 | End: 2025-01-11

## 2024-01-12 RX ORDER — GABAPENTIN 800 MG/1
800 TABLET ORAL 4 TIMES DAILY
Qty: 360 TABLET | Refills: 1 | Status: SHIPPED | OUTPATIENT
Start: 2024-01-12 | End: 2024-01-31 | Stop reason: SDUPTHER

## 2024-01-12 RX ORDER — VENLAFAXINE HYDROCHLORIDE 75 MG/1
75 CAPSULE, EXTENDED RELEASE ORAL DAILY
Qty: 30 CAPSULE | Refills: 1 | Status: SHIPPED | OUTPATIENT
Start: 2024-01-12 | End: 2024-02-05

## 2024-01-12 ASSESSMENT — LIFESTYLE VARIABLES
HOW OFTEN DO YOU HAVE SIX OR MORE DRINKS ON ONE OCCASION: NEVER
HOW MANY STANDARD DRINKS CONTAINING ALCOHOL DO YOU HAVE ON A TYPICAL DAY: PATIENT DOES NOT DRINK
SKIP TO QUESTIONS 9-10: 1
AUDIT-C TOTAL SCORE: 0
AUDIT-C TOTAL SCORE: 0
HOW OFTEN DO YOU HAVE A DRINK CONTAINING ALCOHOL: NEVER

## 2024-01-12 ASSESSMENT — PATIENT HEALTH QUESTIONNAIRE - PHQ9
7. TROUBLE CONCENTRATING ON THINGS, SUCH AS READING THE NEWSPAPER OR WATCHING TELEVISION: MORE THAN HALF THE DAYS
1. LITTLE INTEREST OR PLEASURE IN DOING THINGS: MORE THAN HALF THE DAYS
SUM OF ALL RESPONSES TO PHQ9 QUESTIONS 1 AND 2: 4
5. POOR APPETITE OR OVEREATING: MORE THAN HALF THE DAYS
2. FEELING DOWN, DEPRESSED OR HOPELESS: MORE THAN HALF THE DAYS
8. MOVING OR SPEAKING SO SLOWLY THAT OTHER PEOPLE COULD HAVE NOTICED. OR THE OPPOSITE, BEING SO FIGETY OR RESTLESS THAT YOU HAVE BEEN MOVING AROUND A LOT MORE THAN USUAL: MORE THAN HALF THE DAYS
SUM OF ALL RESPONSES TO PHQ QUESTIONS 1-9: 18
3. TROUBLE FALLING OR STAYING ASLEEP OR SLEEPING TOO MUCH: MORE THAN HALF THE DAYS
6. FEELING BAD ABOUT YOURSELF - OR THAT YOU ARE A FAILURE OR HAVE LET YOURSELF OR YOUR FAMILY DOWN: MORE THAN HALF THE DAYS
10. IF YOU CHECKED OFF ANY PROBLEMS, HOW DIFFICULT HAVE THESE PROBLEMS MADE IT FOR YOU TO DO YOUR WORK, TAKE CARE OF THINGS AT HOME, OR GET ALONG WITH OTHER PEOPLE: VERY DIFFICULT
9. THOUGHTS THAT YOU WOULD BE BETTER OFF DEAD, OR OF HURTING YOURSELF: MORE THAN HALF THE DAYS
4. FEELING TIRED OR HAVING LITTLE ENERGY: MORE THAN HALF THE DAYS

## 2024-01-12 ASSESSMENT — ENCOUNTER SYMPTOMS
LOSS OF SENSATION IN FEET: 0
DEPRESSION: 0
OCCASIONAL FEELINGS OF UNSTEADINESS: 0

## 2024-01-12 ASSESSMENT — ACTIVITIES OF DAILY LIVING (ADL)
BATHING: INDEPENDENT
GROCERY_SHOPPING: INDEPENDENT
DOING_HOUSEWORK: INDEPENDENT
MANAGING_FINANCES: INDEPENDENT
TAKING_MEDICATION: INDEPENDENT
DRESSING: INDEPENDENT

## 2024-01-12 NOTE — PROGRESS NOTES
Subjective   Reason for Visit: Laxmi Sousa is an 53 y.o. female here for a Medicare Wellness visit.     Past Medical, Surgical, and Family History reviewed and updated in chart.    Reviewed all medications by prescribing practitioner or clinical pharmacist (such as prescriptions, OTCs, herbal therapies and supplements) and documented in the medical record.    HPI  Got the pump- A1c 6.5  No CP, SOB, palpitations, HA, vision changes  Gets numbness in hands and legs- on gabapentin  No dizziness     Pain in left flank pain- oxycodone helps with the pain (some times can get away doing only 30 mg instead of 40 mg)  No n/v  Having usual greasy diarrhea- wants to get back on creon    Having a lot of pain from thumb up to elbow  Dull, sore pain  Feels like stretching tendon when tries to stretch arm  Worse- using the hand  Better- heat  Wrist slightly swollen  No weakness  Pain in hands in the morning- hands will cramp when tries to do needle point    Feels like need to get back on something for mood  Feeling blah  Some feeling down  Slight worthless  No hopeless  No SI/HI  Energy level slightly low     Ophtho- 9/23  Dentist- keeping up with  [facility]-   Colonoscopy- 3/18 (repeat 3 years)- will be calling [facility]  GABRIELLA-  UA/Micro- 9/23  Mammo- 9/23  DEXA- 11/22  PAP- 2015- will see GYN  Lung CT-  AAA-  EKG-  Pneumovax- 2013  Prevnar-  Flu- 9/22  Shingrix-  Td-   Advance Directives-  CV risk-  ETOH screen- 1/12/24    Patient Care Team:  Neil Louie DO as PCP - General  Neil Louie DO as PCP - [employer]age PCP  Kelly Guerrier PA-C as Physician Assistant (Family Medicine)  Jayla Alonzo MD as Surgeon (General Surgery)  Selvin Ruiz MD as Referring Physician (Endocrinology)     OARRS:  Neil Louie DO on 1/12/2024  4:36 PM  I have personally reviewed the OARRS report for Laxmi Sousa. I have considered the risks of abuse, dependence, addiction and diversion    Is the patient prescribed a  combination of a benzodiazepine and opioid?  No    Last Urine Drug Screen / ordered today: No  Recent Results (from the past 8760 hour(s))   Opiate Confirmation, Urine    Collection Time: 09/15/23  4:17 PM   Result Value Ref Range    6-Acetylmorphine <25 Cutoff <25 ng/mL    Codeine <50 Cutoff <50 ng/mL    Hydrocodone <25 Cutoff <25 ng/mL    Hydromorphone <25 Cutoff <25 ng/mL    Morphine Urine <50 Cutoff <50 ng/mL    Norhydrocodone <25 Cutoff <25 ng/mL    Noroxycodone >1000 (A) Cutoff <25 ng/mL    Oxycodone 925 (A) Cutoff <25 ng/mL    Oxymorphone 534 (A) Cutoff <25 ng/mL   Drug Screen, Urine With Reflex to Confirmation    Collection Time: 09/15/23  4:17 PM   Result Value Ref Range    DRUG SCREEN COMMENT URINE SEE BELOW     Amphetamine Screen, Urine PRESUMPTIVE NEGATIVE NEGATIVE    Barbiturate Screen, Urine PRESUMPTIVE NEGATIVE NEGATIVE    BENZODIAZEPINE (PRESENCE) IN URINE BY SCREEN METHOD PRESUMPTIVE NEGATIVE NEGATIVE    Cannabinoid Screen, Urine PRESUMPTIVE NEGATIVE NEGATIVE    Cocaine Screen, Urine PRESUMPTIVE NEGATIVE NEGATIVE    Fentanyl, Ur PRESUMPTIVE NEGATIVE NEGATIVE    Opiate Screen, Urine PRESUMPTIVE NEGATIVE NEGATIVE    Oxycodone Screen, Ur PRESUMPTIVE POSITIVE (A) NEGATIVE    PCP Screen, Urine PRESUMPTIVE NEGATIVE NEGATIVE     Results are as expected.         Controlled Substance Agreement:  Date of the Last Agreement: 5/8/23  Reviewed Controlled Substance Agreement including but not limited to the benefits, risks, and alternatives to treatment with a Controlled Substance medication(s).    Opioids:  What is the patient's goal of therapy? Work around the house  Is this being achieved with current treatment? Yes    I have calculated the patient's Morphine Dose Equivalent (MED):   I have considered referral to Pain Management and/or a specialist, and do not feel it is necessary at this time.    I feel that it is clinically indicated to continue this current medication regimen after consideration of  alternative therapies, and other non-opioid treatment.    Opioid Risk Screening:  Family History of Substance Abuse  Alcohol: 0 (2023  4:00 PM)  Illegal Dru (2023  4:00 PM)  Prescription Dru (2023  4:00 PM)    Personal History of Substance Abuse  Alcohol: 0 (2023  4:00 PM)  Illegal Drugs: 0 (2023  4:00 PM)  Prescription Drugs: 0 (2023  4:00 PM)    Patient Age (16-45)  Age (16-45): 0 (2023  4:00 PM)    History of Preadolescent Sexual Abuse  History of Preadolescent Sexual Abuse: 0 (2023  4:00 PM)    Psychological Disease  Attention Deficit Disorder, Obsessive Compulsive Disorder, Bipolar, Schizophrenia: 0 (2023  4:00 PM)  Depression: 1 (2023  4:00 PM)    Total Score  Total Score: 1 (2023  4:00 PM)    Total Score Risk Category  TOTAL SCORE CATEGORY: Low Risk (0-3) (2023  4:00 PM)        Pain Assessment:  Analgesia  What was your pain level on average during the past week?: 4  What was your pain level at its worst during the past week?: 9  What percentage of your pain has been relieved during the past week?: 50 %  Is the amount of pain relief you are now obtaining from your current pain relievers enough to make a real difference in your life?: Y  Query to Clinician: Is the patient's pain relief clinically significant?: Yes    Activities of Daily Living  Physical Functioning: Better  Family Relationships: Better  Social Relationships: Better  Mood: Better  Sleep Patterns: Better  Overall Functioning: Better    Adverse Events  Is patient experiencing any side effects from current pain relievers?: N  Patient's Overall Severity of Side Effects: None      Assessment  Is your overall impression that this patient is benefiting from opioid therapy?: Yes  Specific Analgesic Plan: Continue present regimen        Review of Systems   Constitutional:  Negative for chills, fatigue and fever.   HENT:  Negative for congestion, ear discharge, ear pain, hearing loss, nosebleeds,  sore throat, tinnitus and trouble swallowing.    Eyes:  Negative for pain, redness and visual disturbance.   Respiratory:  Negative for cough, chest tightness, shortness of breath and wheezing.    Cardiovascular:  Negative for chest pain, palpitations and leg swelling.   Gastrointestinal:  Positive for abdominal pain and diarrhea. Negative for blood in stool, constipation, nausea and vomiting.   Endocrine: Negative for cold intolerance, heat intolerance, polydipsia, polyphagia and polyuria.   Genitourinary:  Negative for dysuria, frequency, hematuria and urgency.   Musculoskeletal:  Positive for arthralgias. Negative for back pain and gait problem.   Skin:  Negative for color change and rash.   Neurological:  Positive for numbness. Negative for dizziness, tremors, syncope, weakness and headaches.   Hematological:  Negative for adenopathy. Does not bruise/bleed easily.   Psychiatric/Behavioral:  Positive for dysphoric mood. The patient is not nervous/anxious.        Objective   Vitals:  /82   Pulse 108   Wt 64.2 kg (141 lb 9.6 oz)   LMP  (LMP Unknown)   SpO2 96%   BMI 25.90 kg/m²       Physical Exam  Vitals and nursing note reviewed.   Constitutional:       General: She is not in acute distress.     Appearance: Normal appearance.   HENT:      Head: Normocephalic and atraumatic.      Right Ear: Tympanic membrane, ear canal and external ear normal.      Left Ear: Tympanic membrane, ear canal and external ear normal.      Nose: Nose normal.      Mouth/Throat:      Mouth: Mucous membranes are moist.      Pharynx: Oropharynx is clear.   Eyes:      Extraocular Movements: Extraocular movements intact.      Pupils: Pupils are equal, round, and reactive to light.   Neck:      Vascular: No carotid bruit.   Cardiovascular:      Rate and Rhythm: Normal rate and regular rhythm.      Pulses: Normal pulses.      Heart sounds: Normal heart sounds. No murmur heard.  Pulmonary:      Effort: Pulmonary effort is normal.       Breath sounds: Normal breath sounds.   Abdominal:      General: Abdomen is flat. Bowel sounds are normal.      Palpations: Abdomen is soft. There is no mass.      Tenderness: There is generalized abdominal tenderness.   Musculoskeletal:         General: Normal range of motion.      Cervical back: Normal range of motion and neck supple.   Lymphadenopathy:      Cervical: No cervical adenopathy.   Skin:     Capillary Refill: Capillary refill takes less than 2 seconds.   Neurological:      General: No focal deficit present.      Mental Status: She is alert and oriented to person, place, and time.      Cranial Nerves: No cranial nerve deficit.      Motor: No weakness.      Deep Tendon Reflexes: Reflexes normal.   Psychiatric:         Mood and Affect: Mood normal.         Behavior: Behavior normal.         Assessment/Plan   Problem List Items Addressed This Visit       Allergic rhinitis    Diabetic neuropathy, painful (CMS/HCC)    Idiopathic chronic pancreatitis (CMS/HCC)    Malignant neoplasm of central portion of right breast in female, estrogen receptor positive (CMS/HCC)    MDD (major depressive disorder), recurrent episode, moderate (CMS/HCC)    Persistent microalbuminuria associated with type 1 diabetes mellitus (CMS/HCC)    Uncontrolled type 1 diabetes mellitus with hyperglycemia (CMS/HCC)     Other Visit Diagnoses       Routine general medical examination at health care facility    -  Primary    Encounter for screening for other disorder        Right wrist pain        Chronic pancreatitis, unspecified pancreatitis type (CMS/HCC)            Renewed/continued rest of medications     Updated Health Maintenance in HPI section     Hyperlipidemia- continue meds, low fat/cholesterol diet     Cardiomyopathy- f/u with cardiology     Neuropathy- gabapentin 900 4 times a day, control BS     MDD with anxiety- continue Effexor, Wellbutrin, hydroxyzine 50 mg prn     Chronic Abdominal pain/pancreatitis- continue oxycodone,  increase gabapentin to 800 mg 4 times a day     DM Type 1 with neuropathy- continue meds, referred to endo, [facility]     Vitamin B12 def- check level, supplement     Microalbuminuria- lisinopril, control BS     Asthma- advair, albuterol prn, allergen avoidance     Breast Cancer- F/U with oncology     Exocrine Pancreatic Insufficiency- restart creon, limit fatty foods    Wrist Pain- x-ray, rest it, tennis elbow brace     F/U 3 months

## 2024-01-13 ASSESSMENT — ENCOUNTER SYMPTOMS
ABDOMINAL PAIN: 1
NAUSEA: 0
FEVER: 0
HEADACHES: 0
BRUISES/BLEEDS EASILY: 0
FATIGUE: 0
PALPITATIONS: 0
DIARRHEA: 1
DYSURIA: 0
CONSTIPATION: 0
BACK PAIN: 0
DYSPHORIC MOOD: 1
HEMATURIA: 0
EYE PAIN: 0
CHEST TIGHTNESS: 0
COUGH: 0
COLOR CHANGE: 0
VOMITING: 0
EYE REDNESS: 0
WEAKNESS: 0
CHILLS: 0
ADENOPATHY: 0
SHORTNESS OF BREATH: 0
FREQUENCY: 0
SORE THROAT: 0
WHEEZING: 0
TREMORS: 0
TROUBLE SWALLOWING: 0
BLOOD IN STOOL: 0
POLYPHAGIA: 0
NUMBNESS: 1
POLYDIPSIA: 0
DIZZINESS: 0
NERVOUS/ANXIOUS: 0
ARTHRALGIAS: 1

## 2024-01-16 DIAGNOSIS — J45.40 MODERATE PERSISTENT ASTHMA, UNSPECIFIED WHETHER COMPLICATED (HHS-HCC): ICD-10-CM

## 2024-01-16 RX ORDER — FLUTICASONE PROPIONATE AND SALMETEROL 250; 50 UG/1; UG/1
1 POWDER RESPIRATORY (INHALATION) 2 TIMES DAILY
Qty: 180 EACH | Refills: 1 | Status: SHIPPED | OUTPATIENT
Start: 2024-01-16

## 2024-01-25 ENCOUNTER — APPOINTMENT (OUTPATIENT)
Dept: ENDOCRINOLOGY | Facility: CLINIC | Age: 54
End: 2024-01-25
Payer: MEDICARE

## 2024-01-29 DIAGNOSIS — M25.50 POLYARTHRALGIA: ICD-10-CM

## 2024-01-29 RX ORDER — DICLOFENAC SODIUM 10 MG/G
2 GEL TOPICAL 4 TIMES DAILY PRN
Qty: 300 G | Refills: 1 | Status: SHIPPED | OUTPATIENT
Start: 2024-01-29

## 2024-01-31 DIAGNOSIS — K86.0 ALCOHOL-INDUCED CHRONIC PANCREATITIS (MULTI): ICD-10-CM

## 2024-01-31 DIAGNOSIS — J45.40 MODERATE PERSISTENT ASTHMA, UNSPECIFIED WHETHER COMPLICATED (HHS-HCC): ICD-10-CM

## 2024-01-31 DIAGNOSIS — E11.40 DIABETIC NEUROPATHY, PAINFUL (MULTI): ICD-10-CM

## 2024-01-31 DIAGNOSIS — R10.9 CHRONIC ABDOMINAL PAIN: ICD-10-CM

## 2024-01-31 DIAGNOSIS — G89.29 CHRONIC ABDOMINAL PAIN: ICD-10-CM

## 2024-01-31 RX ORDER — ALBUTEROL SULFATE 90 UG/1
2 AEROSOL, METERED RESPIRATORY (INHALATION) EVERY 4 HOURS PRN
Qty: 18 G | Refills: 1 | Status: SHIPPED | OUTPATIENT
Start: 2024-01-31 | End: 2024-05-13

## 2024-01-31 RX ORDER — OXYCODONE HYDROCHLORIDE 10 MG/1
10 TABLET ORAL EVERY 4 HOURS PRN
Qty: 150 TABLET | Refills: 0 | Status: SHIPPED | OUTPATIENT
Start: 2024-01-31 | End: 2024-03-12 | Stop reason: SDUPTHER

## 2024-01-31 RX ORDER — GABAPENTIN 800 MG/1
800 TABLET ORAL 4 TIMES DAILY
Qty: 360 TABLET | Refills: 1 | Status: SHIPPED | OUTPATIENT
Start: 2024-01-31 | End: 2024-07-29

## 2024-02-04 DIAGNOSIS — F33.1 MDD (MAJOR DEPRESSIVE DISORDER), RECURRENT EPISODE, MODERATE (MULTI): ICD-10-CM

## 2024-02-05 ENCOUNTER — CLINICAL SUPPORT (OUTPATIENT)
Dept: ENDOCRINOLOGY | Facility: CLINIC | Age: 54
End: 2024-02-05
Payer: MEDICARE

## 2024-02-05 VITALS
HEART RATE: 77 BPM | SYSTOLIC BLOOD PRESSURE: 92 MMHG | BODY MASS INDEX: 26.12 KG/M2 | DIASTOLIC BLOOD PRESSURE: 70 MMHG | WEIGHT: 142.8 LBS

## 2024-02-05 DIAGNOSIS — E10.65 TYPE 1 DIABETES MELLITUS WITH HYPERGLYCEMIA (MULTI): Primary | ICD-10-CM

## 2024-02-05 DIAGNOSIS — E78.2 MIXED HYPERLIPIDEMIA: ICD-10-CM

## 2024-02-05 LAB — POC HEMOGLOBIN A1C: 9.9 % (ref 4.2–6.5)

## 2024-02-05 PROCEDURE — 99214 OFFICE O/P EST MOD 30 MIN: CPT | Performed by: INTERNAL MEDICINE

## 2024-02-05 PROCEDURE — 83036 HEMOGLOBIN GLYCOSYLATED A1C: CPT | Performed by: INTERNAL MEDICINE

## 2024-02-05 PROCEDURE — 95251 CONT GLUC MNTR ANALYSIS I&R: CPT | Performed by: INTERNAL MEDICINE

## 2024-02-05 RX ORDER — VENLAFAXINE HYDROCHLORIDE 75 MG/1
75 CAPSULE, EXTENDED RELEASE ORAL DAILY
Qty: 90 CAPSULE | Refills: 1 | Status: SHIPPED | OUTPATIENT
Start: 2024-02-05 | End: 2024-08-03

## 2024-02-05 NOTE — PROGRESS NOTES
HPI     54yo presents for follow up metabolic management. Pt with Diabetes type 3c (dx 2011) (neuropathy), HTN, Dyslipidemia,depression/anxiety, chronic pancreatitis due to pancreatic divisum. A1c-9.6% last visit,  9.9% today.  Pt is testing sugars >4 times per day using dexcom g6.  Pt is having low sugars 0 times/week.  Pt's typical blood sugars are running 200-300's on waking, 200's afternoon, 200-300 at bedtime.  Pt is following a carb controlled diet and knows reasonable carb allowances. Pt is struggling to afford their medications, previously getting help from [facility].    Pump:  tandem tslim with dexcom cgm using control IQ   - pump training with [facility] Aug 2022,  needed 2 replacement pumps 2023  - assisted in repairing pump to smartphone tori, we're connected in tconnect   -historically not bolusing for meals, not counting carbs or using any sort of base doses  -no sleep schedule set in new pump, can revisit future f/u   -has back up basal insulin at home in case of pump failure, denies problems with sites/insertion sets  Basal:           12a 0.8 u/hr            10a 1.0  ICR:           12a 1:10  ISF:           12a 50    - pump only 57% active past 30 days (!!),  experiencing signif signal loss/issues with cgm leading to a lot of variable / inaccurate readings. Pt manually shutting pump off for long periods of time      Not currently on a statin,  aches with atorvastatin in past per pt.      Current Outpatient Medications:     albuterol 90 mcg/actuation inhaler, Inhale 2 puffs every 4 hours if needed for wheezing., Disp: 18 g, Rfl: 1    anastrozole (Arimidex) 1 mg tablet, Take 1 tablet (1 mg total) by mouth once daily., Disp: , Rfl:     atorvastatin (Lipitor) 10 mg tablet, Take 1 tablet (10 mg) by mouth once daily., Disp: 90 tablet, Rfl: 3    blood-glucose meter misc, UAD, Disp: 1 each, Rfl: 0    Dexcom G6 Sensor device, CHANGE every 10 DAYS as directed, Disp: , Rfl:     Dexcom G6 Transmitter device, Inject under  the skin if needed., Disp: , Rfl:     diclofenac sodium (Voltaren) 1 % gel, APPLY 0.5 APPLICATIONS TOPICALLY 4 TIMES A DAY AS NEEDED (JOINT PAIN)., Disp: 300 g, Rfl: 1    esomeprazole (NexIUM) 40 mg DR capsule, Take 1 capsule (40 mg) by mouth once daily in the morning. Take before meals., Disp: , Rfl:     famotidine (Pepcid) 40 mg tablet, TAKE 1 TABLET BY MOUTH EVERY DAY, Disp: 90 tablet, Rfl: 1    fluticasone (Flonase) 50 mcg/actuation nasal spray, Administer 2 sprays into each nostril once daily., Disp: 48 g, Rfl: 3    gabapentin (Neurontin) 800 mg tablet, Take 1 tablet (800 mg) by mouth 4 times a day., Disp: 360 tablet, Rfl: 1    hydrOXYzine HCL (Atarax) 50 mg tablet, Take 1 tablet (50 mg) by mouth 4 times a day., Disp: , Rfl:     insulin lispro (HumaLOG) 100 unit/mL injection, INJECT AS DIRECTED UP TO 80 UNITS DAILY IN INSULIN PUMP, Disp: 80 mL, Rfl: 1    lipase-protease-amylase (Creon) 24,000-76,000 -120,000 unit capsule, 3 po ac and 2 po before snacks, Disp: 1260 capsule, Rfl: 1    lisinopril 2.5 mg tablet, Take 1 tablet (2.5 mg) by mouth once daily., Disp: 90 tablet, Rfl: 3    loratadine (Claritin) 10 mg tablet, Take 1 tablet (10 mg) by mouth once daily., Disp: 90 tablet, Rfl: 3    naloxone (Narcan) 4 mg/0.1 mL nasal spray, Administer 1 spray (4 mg) into affected nostril(s) if needed for opioid reversal. May repeat every 2-3 minutes if needed, alternating nostrils, until medical assistance becomes available., Disp: 2 each, Rfl: 1    ondansetron (Zofran) 4 mg tablet, Take 2 tablets (8 mg) by mouth every 12 hours if needed for vomiting or nausea., Disp: 20 tablet, Rfl: 2    oxyCODONE (Roxicodone) 10 mg immediate release tablet, Take 1 tablet (10 mg) by mouth every 4 hours if needed for severe pain (7 - 10)., Disp: 150 tablet, Rfl: 0    pantoprazole (ProtoNix) 40 mg EC tablet, Take 1 tablet (40 mg) by mouth once daily in the morning. Take before meals., Disp: , Rfl:     traZODone (Desyrel) 50 mg tablet, TAKE 1-3  "TABLETS BY MOUTH AT BEDTIME AS NEEDED FOR ANXIETY, Disp: 180 tablet, Rfl: 1    triamterene-hydrochlorothiazid (Maxzide-25) 37.5-25 mg tablet, Take 1 tablet by mouth once daily., Disp: , Rfl:     venlafaxine XR (Effexor-XR) 75 mg 24 hr capsule, Take 1 capsule (75 mg) by mouth once daily. Take with food., Disp: 90 capsule, Rfl: 1    Wixela Inhub 250-50 mcg/dose diskus inhaler, INHALE 1 PUFF BY MOUTH 2 TIMES A DAY, Disp: 180 each, Rfl: 1    Allergies as of 02/05/2024 - Reviewed 02/05/2024   Allergen Reaction Noted    Morphine Hives 05/08/2023       BP 92/70   Pulse 77   Wt 64.8 kg (142 lb 12.8 oz)   LMP  (LMP Unknown)   BMI 26.12 kg/m²     Labs:   Lab Results   Component Value Date    WBC 7.6 09/18/2023    NRBC 0.0 08/23/2020    RBC 3.97 (L) 09/18/2023    HGB 12.9 09/18/2023    HCT 43.2 09/18/2023     (L) 09/18/2023     Lab Results   Component Value Date    CALCIUM 9.4 09/18/2023     (H) 09/18/2023    ALKPHOS 626 (H) 09/18/2023    BILITOT 0.4 09/18/2023    PROT 6.4 09/18/2023    ALBUMIN 3.8 09/18/2023     (L) 09/18/2023    K 5.1 09/18/2023     09/18/2023    CO2 22 09/18/2023    ANIONGAP 17 09/18/2023    BUN 20 09/18/2023    CREATININE 0.76 09/18/2023    GLUCOSE 266 (H) 09/18/2023    ALT 93 (H) 09/18/2023     Lab Results   Component Value Date    CHOL 176 06/13/2022    TRIG 107 06/13/2022    HDL 64.2 06/13/2022     No results found for: \"MICROALBCREA\"  Lab Results   Component Value Date    TSH 2.12 06/25/2019     Lab Results   Component Value Date    CTZVEOPX82 601 06/13/2022     Lab Results   Component Value Date    HGBA1C 9.9 (A) 02/05/2024       Assessment/Plan   1. Type 1 diabetes mellitus with hyperglycemia (CMS/[facility])    Per pt has a lot of loose skin from wt loss years ago,  my suspicion this is what is causing cgm issues, filament likely getting dislodged leading to inaccurate readings/failures    - reviewed various site options can try wearing sensor reinforcing avoiding all areas " with looser skin     Pump failure protocol reviewed,  reinforced disconnecting from pump no more than 1hr at a time     Patient Assistance Screening (VAF)  Patient verbally reports monthly or yearly income which is less than 400% federal poverty level.   - pt to get me a copy of most recent 1040 form   Application for program will be submitted for the following medications: humalog vials     Given info for updating pump software for G7,  pt to request switch in supplies from dme    Patient has been using a continuous glucose monitor and performing frequent testing (4 or more times daily); she is insulin treated with insulin pump therapy and the treatment regimen requires frequent adjustment based on blood sugars. CGM is medically necessary. It will allow us to remotely monitor patient's blood sugar and make necessary adjustments during COVID19.  Patient is adherent to diabetic treatment plan and participates in ongoing education and support. She is also motivated and knowledgeable about the use of CGM.    2. Mixed hyperlipidemia  Aches with atorvastatin in past per pt  Suggest trying crestor 5mg weekly x1mon,  incr to 3 days/week then daily as tolerated   Update fasting labs prior to next visit         Follow up: 3mon 30min BB    -labs/tests/notes reviewed  -reviewed and counseled patient on medication monitoring and side effects    Treatment and plan discussed with Dr. Ruiz.  LINDEN Douglas, PharmD, BC-ADM, [facility].     Medical Decision Making  Complexity of problem: Chronic illness of diabetes mellitus uncontrolled, progressing  Data analyzed and reviewed: Reviewed prior notes, blood glucose data, labs including HgbA1c, lipids, serum chemistries.  Ordered tests.   Risk of complications and morbidities: Is definite because of use of insulin and risk of hypoglycemia.  Prescription medications reviewed and modifications made.  Compliance assessed.  Addressed social determinants of health including food insecurity.

## 2024-02-07 RX ORDER — LANCETS 33 GAUGE
EACH MISCELLANEOUS
Qty: 100 EACH | Refills: 1 | Status: SHIPPED | OUTPATIENT
Start: 2024-02-07

## 2024-02-07 RX ORDER — BLOOD-GLUCOSE METER
EACH MISCELLANEOUS
Qty: 100 STRIP | Refills: 1 | Status: SHIPPED | OUTPATIENT
Start: 2024-02-07

## 2024-02-07 RX ORDER — ROSUVASTATIN CALCIUM 5 MG/1
5 TABLET, COATED ORAL DAILY
Qty: 30 TABLET | Refills: 11 | Status: SHIPPED | OUTPATIENT
Start: 2024-02-07 | End: 2025-02-06

## 2024-02-07 NOTE — PROGRESS NOTES
I, Dr Selvin Ruiz, have reviewed this progress note, medication list, vital signs, any pertinent lab values, and any CGM data if present with the [facility] and  face to face during this visit today. This note reflects the treatment plan that was made under my direction after reviewing the above mentioned elements while face to face with the patient and CDE.  I personally answered and addressed any questions and concerns the patient had during the visit today.  The CDE entered the data in this note under my direction and I personally reviewed it, signed any lab or medication orders that I instructed to be completed. I am the billing provider for this visit and the level of service was determined by my involvement in the Medical Decision Making Component of this visit while face to face with the patient.    Electronically signed by Selvin Ruiz MD on 2/7/24 at 2:37 PM.

## 2024-02-12 ENCOUNTER — HOSPITAL ENCOUNTER (OUTPATIENT)
Dept: RADIOLOGY | Facility: CLINIC | Age: 54
Discharge: HOME | End: 2024-02-12
Payer: MEDICARE

## 2024-02-12 DIAGNOSIS — M25.531 RIGHT WRIST PAIN: ICD-10-CM

## 2024-02-12 PROCEDURE — 73110 X-RAY EXAM OF WRIST: CPT | Mod: RIGHT SIDE | Performed by: RADIOLOGY

## 2024-02-12 PROCEDURE — 73110 X-RAY EXAM OF WRIST: CPT | Mod: RT

## 2024-02-22 DIAGNOSIS — C50.011 MALIGNANT NEOPLASM OF NIPPLE OF BOTH BREASTS IN FEMALE, ESTROGEN RECEPTOR POSITIVE (MULTI): ICD-10-CM

## 2024-02-22 DIAGNOSIS — C50.012 MALIGNANT NEOPLASM OF NIPPLE OF BOTH BREASTS IN FEMALE, ESTROGEN RECEPTOR POSITIVE (MULTI): ICD-10-CM

## 2024-02-22 DIAGNOSIS — Z17.0 MALIGNANT NEOPLASM OF NIPPLE OF BOTH BREASTS IN FEMALE, ESTROGEN RECEPTOR POSITIVE (MULTI): ICD-10-CM

## 2024-02-22 DIAGNOSIS — R74.8 ELEVATED LIVER ENZYMES: Primary | ICD-10-CM

## 2024-02-26 LAB
ATRIAL RATE: 72 BPM
P AXIS: 50 DEGREES
P OFFSET: 187 MS
P ONSET: 133 MS
PR INTERVAL: 128 MS
Q ONSET: 197 MS
QRS COUNT: 12 BEATS
QRS DURATION: 122 MS
QT INTERVAL: 406 MS
QTC CALCULATION(BAZETT): 444 MS
QTC FREDERICIA: 431 MS
R AXIS: 47 DEGREES
T AXIS: 42 DEGREES
T OFFSET: 400 MS
VENTRICULAR RATE: 72 BPM

## 2024-03-04 ENCOUNTER — OFFICE VISIT (OUTPATIENT)
Dept: PRIMARY CARE | Facility: CLINIC | Age: 54
End: 2024-03-04
Payer: MEDICARE

## 2024-03-04 VITALS
BODY MASS INDEX: 27.07 KG/M2 | HEART RATE: 87 BPM | DIASTOLIC BLOOD PRESSURE: 82 MMHG | SYSTOLIC BLOOD PRESSURE: 122 MMHG | WEIGHT: 148 LBS | OXYGEN SATURATION: 97 %

## 2024-03-04 DIAGNOSIS — M25.531 RIGHT WRIST PAIN: Primary | ICD-10-CM

## 2024-03-04 DIAGNOSIS — M65.321 TRIGGER INDEX FINGER OF RIGHT HAND: ICD-10-CM

## 2024-03-04 PROCEDURE — 3079F DIAST BP 80-89 MM HG: CPT | Performed by: FAMILY MEDICINE

## 2024-03-04 PROCEDURE — 99213 OFFICE O/P EST LOW 20 MIN: CPT | Performed by: FAMILY MEDICINE

## 2024-03-04 PROCEDURE — 4010F ACE/ARB THERAPY RXD/TAKEN: CPT | Performed by: FAMILY MEDICINE

## 2024-03-04 PROCEDURE — 20550 NJX 1 TENDON SHEATH/LIGAMENT: CPT | Performed by: FAMILY MEDICINE

## 2024-03-04 PROCEDURE — 3074F SYST BP LT 130 MM HG: CPT | Performed by: FAMILY MEDICINE

## 2024-03-04 RX ORDER — LIDOCAINE HYDROCHLORIDE 10 MG/ML
1 INJECTION INFILTRATION; PERINEURAL
Status: COMPLETED | OUTPATIENT
Start: 2024-03-04 | End: 2024-03-04

## 2024-03-04 RX ORDER — TRIAMCINOLONE ACETONIDE 40 MG/ML
20 INJECTION, SUSPENSION INTRA-ARTICULAR; INTRAMUSCULAR
Status: COMPLETED | OUTPATIENT
Start: 2024-03-04 | End: 2024-03-04

## 2024-03-04 RX ORDER — TRIAMCINOLONE ACETONIDE 40 MG/ML
10 INJECTION, SUSPENSION INTRA-ARTICULAR; INTRAMUSCULAR
Status: COMPLETED | OUTPATIENT
Start: 2024-03-04 | End: 2024-03-04

## 2024-03-04 RX ORDER — LIDOCAINE HYDROCHLORIDE 10 MG/ML
0.5 INJECTION INFILTRATION; PERINEURAL
Status: COMPLETED | OUTPATIENT
Start: 2024-03-04 | End: 2024-03-04

## 2024-03-04 RX ADMIN — TRIAMCINOLONE ACETONIDE 10 MG: 40 INJECTION, SUSPENSION INTRA-ARTICULAR; INTRAMUSCULAR at 21:20

## 2024-03-04 RX ADMIN — LIDOCAINE HYDROCHLORIDE 1 ML: 10 INJECTION INFILTRATION; PERINEURAL at 21:23

## 2024-03-04 RX ADMIN — TRIAMCINOLONE ACETONIDE 20 MG: 40 INJECTION, SUSPENSION INTRA-ARTICULAR; INTRAMUSCULAR at 21:23

## 2024-03-04 RX ADMIN — LIDOCAINE HYDROCHLORIDE 0.5 ML: 10 INJECTION INFILTRATION; PERINEURAL at 21:20

## 2024-03-04 NOTE — PROGRESS NOTES
"Subjective   Patient ID: Laxmi Sousa is a 53 y.o. female who presents for Follow-up (For a injection R  wrist and arm).  Pt presents for a complaint of Tenosynovitis of the R 2nd finger. She states in the morning her finger is \"stuck\" in flexion and she has to force her finger to become straight. She also complains of pain in her wrist, elbow, and shoulder on the R side. She states that motrin alleviates the pain, but the pain comes back with use of her arm. She also reports taking more gabapentin because her neuropathy in her legs is not responding as well as it has in the past.       Current Outpatient Medications:   •  albuterol 90 mcg/actuation inhaler, Inhale 2 puffs every 4 hours if needed for wheezing., Disp: 18 g, Rfl: 1  •  anastrozole (Arimidex) 1 mg tablet, Take 1 tablet (1 mg total) by mouth once daily., Disp: , Rfl:   •  atorvastatin (Lipitor) 10 mg tablet, Take 1 tablet (10 mg) by mouth once daily., Disp: 90 tablet, Rfl: 3  •  blood-glucose meter misc, UAD, Disp: 1 each, Rfl: 0  •  Dexcom G6 Sensor device, CHANGE every 10 DAYS as directed, Disp: , Rfl:   •  Dexcom G6 Transmitter device, Inject under the skin if needed., Disp: , Rfl:   •  diclofenac sodium (Voltaren) 1 % gel, APPLY 0.5 APPLICATIONS TOPICALLY 4 TIMES A DAY AS NEEDED (JOINT PAIN)., Disp: 300 g, Rfl: 1  •  esomeprazole (NexIUM) 40 mg DR capsule, Take 1 capsule (40 mg) by mouth once daily in the morning. Take before meals., Disp: , Rfl:   •  famotidine (Pepcid) 40 mg tablet, TAKE 1 TABLET BY MOUTH EVERY DAY, Disp: 90 tablet, Rfl: 1  •  fluticasone (Flonase) 50 mcg/actuation nasal spray, Administer 2 sprays into each nostril once daily., Disp: 48 g, Rfl: 3  •  gabapentin (Neurontin) 800 mg tablet, Take 1 tablet (800 mg) by mouth 4 times a day., Disp: 360 tablet, Rfl: 1  •  hydrOXYzine HCL (Atarax) 50 mg tablet, Take 1 tablet (50 mg) by mouth 4 times a day., Disp: , Rfl:   •  insulin lispro (HumaLOG) 100 unit/mL injection, INJECT AS " DIRECTED UP TO 80 UNITS DAILY IN INSULIN PUMP, Disp: 80 mL, Rfl: 1  •  lipase-protease-amylase (Creon) 24,000-76,000 -120,000 unit capsule, 3 po ac and 2 po before snacks, Disp: 1260 capsule, Rfl: 1  •  lisinopril 2.5 mg tablet, Take 1 tablet (2.5 mg) by mouth once daily., Disp: 90 tablet, Rfl: 3  •  loratadine (Claritin) 10 mg tablet, Take 1 tablet (10 mg) by mouth once daily., Disp: 90 tablet, Rfl: 3  •  naloxone (Narcan) 4 mg/0.1 mL nasal spray, Administer 1 spray (4 mg) into affected nostril(s) if needed for opioid reversal. May repeat every 2-3 minutes if needed, alternating nostrils, until medical assistance becomes available., Disp: 2 each, Rfl: 1  •  ondansetron (Zofran) 4 mg tablet, Take 2 tablets (8 mg) by mouth every 12 hours if needed for vomiting or nausea., Disp: 20 tablet, Rfl: 2  •  OneTouch Delica Plus Lancet 33 gauge misc, As directed to check blood sugar daily, Disp: 100 each, Rfl: 1  •  OneTouch Verio test strips strip, As directed to check blood sugar daily, Disp: 100 strip, Rfl: 1  •  pantoprazole (ProtoNix) 40 mg EC tablet, Take 1 tablet (40 mg) by mouth once daily in the morning. Take before meals., Disp: , Rfl:   •  traZODone (Desyrel) 50 mg tablet, TAKE 1-3 TABLETS BY MOUTH AT BEDTIME AS NEEDED FOR ANXIETY, Disp: 180 tablet, Rfl: 1  •  triamterene-hydrochlorothiazid (Maxzide-25) 37.5-25 mg tablet, Take 1 tablet by mouth once daily., Disp: , Rfl:   •  venlafaxine XR (Effexor-XR) 75 mg 24 hr capsule, Take 1 capsule (75 mg) by mouth once daily. Take with food., Disp: 90 capsule, Rfl: 1  •  Wixela Inhub 250-50 mcg/dose diskus inhaler, INHALE 1 PUFF BY MOUTH 2 TIMES A DAY, Disp: 180 each, Rfl: 1  •  rosuvastatin (Crestor) 5 mg tablet, Take 1 tablet (5 mg) by mouth once daily., Disp: 30 tablet, Rfl: 11   Past Surgical History:   Procedure Laterality Date   • BREAST LUMPECTOMY W/ NEEDLE LOCALIZATION Right 07/20/2022    Adel lymph node excisional biopsy.   • CHOLECYSTECTOMY  07/05/2013     Cholecystectomy Laparoscopic   • CYSTOSCOPY      URETHRAL DILATION   • LITHOTRIPSY  2013    Renal Lithotripsy   • OTHER SURGICAL HISTORY  2013    Endoscopic Retrograde Cholangiopancreatography (ERCP)   • OTHER SURGICAL HISTORY  2019     section   • OTHER SURGICAL HISTORY  2014    ERCP With Change Of Tube/Stent   • OTHER SURGICAL HISTORY  2015    Incision And Drainage Of Skin Abscess Buttocks   • TONSILLECTOMY  2018    Tonsillectomy   • VENTRAL HERNIA REPAIR  2018    Ventral Hernia Repair      Past Medical History:   Diagnosis Date   • Acute actinic otitis externa, left ear 2020    Acute actinic otitis externa of left ear   • Acute candidiasis of vulva and vagina 2016    Vaginitis due to Candida albicans   • Acute mastoiditis without complications, left ear 2020    Acute mastoiditis of left side   • Acute serous otitis media, left ear 2020    Non-recurrent acute serous otitis media of left ear   • Acute suppurative otitis media without spontaneous rupture of ear drum, left ear 2020    Non-recurrent acute suppurative otitis media of left ear without spontaneous rupture of tympanic membrane   • Anxiety and depression    • Asthma    • Chronic pancreatitis (CMS/[facility])    • Cutaneous abscess of buttock 2015    Abscess of buttock, right   • Diabetic neuropathy (CMS/[facility])    • Diverticular disease of colon    • DM type 1 (diabetes mellitus, type 1) (CMS/[facility])    • Encounter for debridement of left postmastoidectomy cavity 2023   • Enlarged lymph node 2023   • Fatigue 2023   • GERD (gastroesophageal reflux disease)    • Hidradenitis suppurativa    • History of lumpectomy of right breast 2022   • History of right breast cancer     Ductal carcinoma in situ (DCIS)   • LFT elevation    • Other chronic nonsuppurative otitis media, left ear 2021    Other chronic nonsuppurative otitis media of left ear   • Other chronic  suppurative otitis media, left ear 11/06/2020    Chronic suppurative otitis media of left ear, unspecified otitis media location   • Other obesity due to excess calories 03/31/2020    Class 1 obesity due to excess calories with serious comorbidity and body mass index (BMI) of 30.0 to 30.9 in adult   • Personal history of other diseases of the digestive system     History of chronic pancreatitis   • Personal history of other diseases of the respiratory system 02/24/2021    History of acute bronchitis   • Personal history of other diseases of the respiratory system 01/23/2017    History of acute sinusitis   • Personal history of other mental and behavioral disorders 07/07/2017    History of depression   • Personal history of other specified conditions 08/28/2019    History of painful urination   • Stress-induced cardiomyopathy 04/04/2023   • Swimmer's ear, left ear 04/28/2020    Acute swimmer's ear of left side   • Urethral stricture    • Viral conjunctivitis, unspecified 07/16/2021    Acute viral conjunctivitis, unspecified laterality   • Weak urinary stream 04/04/2023     Social History     Tobacco Use   • Smoking status: Every Day     Packs/day: .5     Types: Cigarettes   • Smokeless tobacco: Never   Vaping Use   • Vaping Use: Never used   Substance Use Topics   • Alcohol use: Never   • Drug use: Never      No family history on file.   Review of Systems    Objective   /82   Pulse 87   Wt 67.1 kg (148 lb)   LMP  (LMP Unknown)   SpO2 97%   BMI 27.07 kg/m²    Physical Exam  Vitals and nursing note reviewed.   Constitutional:       Appearance: Normal appearance.   HENT:      Head: Normocephalic and atraumatic.   Eyes:      Extraocular Movements: Extraocular movements intact.      Conjunctiva/sclera: Conjunctivae normal.      Pupils: Pupils are equal, round, and reactive to light.   Cardiovascular:      Rate and Rhythm: Normal rate and regular rhythm.      Pulses: Normal pulses.      Heart sounds: Normal  heart sounds.   Pulmonary:      Effort: Pulmonary effort is normal.      Breath sounds: Normal breath sounds.   Musculoskeletal:      Comments: Right 2nd finger trigger finger (locks in flexion)    TTP around right wrist- especially dorsally  No increased laxity  No decreased ROM   Skin:     General: Skin is warm.      Capillary Refill: Capillary refill takes less than 2 seconds.   Neurological:      General: No focal deficit present.      Mental Status: She is alert and oriented to person, place, and time.      Sensory: No sensory deficit.      Motor: No weakness.      Deep Tendon Reflexes: Reflexes normal.   Psychiatric:         Mood and Affect: Mood normal.         Behavior: Behavior normal.     Injection Upper Extremity: R index A1 for trigger finger on 3/4/2024 9:20 PM  Indications: joint swelling, pain and tendon swelling  Details: 25 G needle, volar approach  Medications: 10 mg triamcinolone acetonide 40 mg/mL; 0.5 mL lidocaine 10 mg/mL (1 %)  Outcome: tolerated well, no immediate complications  Procedure, treatment alternatives, risks and benefits explained, specific risks discussed. Consent was given by the patient. Immediately prior to procedure a time out was called to verify the correct patient, procedure, equipment, support staff and site/side marked as required. Patient was prepped and draped in the usual sterile fashion.       Joint Injection Medium/Arthrocentesis: R distal radioulnar on 3/4/2024 9:23 PM  Indications: pain and joint swelling  Details: 25 G needle, dorsal approach  Medications: 20 mg triamcinolone acetonide 40 mg/mL; 1 mL lidocaine 10 mg/mL (1 %)  Outcome: tolerated well, no immediate complications  Procedure, treatment alternatives, risks and benefits explained, specific risks discussed. Consent was given by the patient. Immediately prior to procedure a time out was called to verify the correct patient, procedure, equipment, support staff and site/side marked as required. Patient was  prepped and draped in the usual sterile fashion.         Assessment/Plan   Problem List Items Addressed This Visit    None  Visit Diagnoses       Right wrist pain    -  Primary    Trigger index finger of right hand            Ibuprofen prn pain, ice/heat    Patient understands and agrees with treatment plan    Neil Louie, DO

## 2024-03-12 ENCOUNTER — TELEPHONE (OUTPATIENT)
Dept: PRIMARY CARE | Facility: CLINIC | Age: 54
End: 2024-03-12
Payer: MEDICARE

## 2024-03-12 DIAGNOSIS — G89.29 CHRONIC ABDOMINAL PAIN: ICD-10-CM

## 2024-03-12 DIAGNOSIS — K86.0 ALCOHOL-INDUCED CHRONIC PANCREATITIS (MULTI): ICD-10-CM

## 2024-03-12 DIAGNOSIS — R10.9 CHRONIC ABDOMINAL PAIN: ICD-10-CM

## 2024-03-12 RX ORDER — OXYCODONE HYDROCHLORIDE 10 MG/1
10 TABLET ORAL EVERY 4 HOURS PRN
Qty: 150 TABLET | Refills: 0 | Status: SHIPPED | OUTPATIENT
Start: 2024-03-12 | End: 2024-04-03 | Stop reason: SDUPTHER

## 2024-03-13 DIAGNOSIS — K21.9 GASTROESOPHAGEAL REFLUX DISEASE WITHOUT ESOPHAGITIS: ICD-10-CM

## 2024-03-13 DIAGNOSIS — G47.00 INSOMNIA, UNSPECIFIED: ICD-10-CM

## 2024-03-13 RX ORDER — TRAZODONE HYDROCHLORIDE 50 MG/1
TABLET ORAL
Qty: 270 TABLET | Refills: 1 | Status: SHIPPED | OUTPATIENT
Start: 2024-03-13

## 2024-03-13 RX ORDER — FAMOTIDINE 40 MG/1
40 TABLET, FILM COATED ORAL DAILY
Qty: 90 TABLET | Refills: 1 | Status: SHIPPED | OUTPATIENT
Start: 2024-03-13

## 2024-03-18 ENCOUNTER — APPOINTMENT (OUTPATIENT)
Dept: HEMATOLOGY/ONCOLOGY | Facility: CLINIC | Age: 54
End: 2024-03-18
Payer: MEDICARE

## 2024-04-03 DIAGNOSIS — K86.0 ALCOHOL-INDUCED CHRONIC PANCREATITIS (MULTI): ICD-10-CM

## 2024-04-03 DIAGNOSIS — J02.0 STREP PHARYNGITIS: Primary | ICD-10-CM

## 2024-04-03 DIAGNOSIS — G89.29 CHRONIC ABDOMINAL PAIN: ICD-10-CM

## 2024-04-03 DIAGNOSIS — R10.9 CHRONIC ABDOMINAL PAIN: ICD-10-CM

## 2024-04-03 RX ORDER — AMOXICILLIN 875 MG/1
875 TABLET, FILM COATED ORAL 2 TIMES DAILY
Qty: 20 TABLET | Refills: 0 | Status: SHIPPED | OUTPATIENT
Start: 2024-04-03 | End: 2024-04-13

## 2024-04-03 RX ORDER — OXYCODONE HYDROCHLORIDE 10 MG/1
10 TABLET ORAL EVERY 4 HOURS PRN
Qty: 150 TABLET | Refills: 0 | Status: SHIPPED | OUTPATIENT
Start: 2024-04-03 | End: 2024-05-13 | Stop reason: SDUPTHER

## 2024-04-03 NOTE — TELEPHONE ENCOUNTER
Sent in amoxicillin Isha Leal (MD)  EEGEpilepsy; Neurology  [address], [address]  [place] [address]  Phone: [phone]  Follow Up Time:

## 2024-04-30 DIAGNOSIS — E10.65 TYPE 1 DIABETES MELLITUS WITH HYPERGLYCEMIA (MULTI): Primary | ICD-10-CM

## 2024-04-30 RX ORDER — INSULIN LISPRO 100 [IU]/ML
INJECTION, SOLUTION INTRAVENOUS; SUBCUTANEOUS
Qty: 80 ML | Refills: 1 | Status: SHIPPED | OUTPATIENT
Start: 2024-04-30 | End: 2025-04-30

## 2024-05-01 RX ORDER — INSULIN LISPRO 100 [IU]/ML
INJECTION, SOLUTION INTRAVENOUS; SUBCUTANEOUS
Qty: 80 ML | Refills: 1 | Status: SHIPPED | OUTPATIENT
Start: 2024-05-01 | End: 2025-05-01

## 2024-05-11 DIAGNOSIS — J45.40 MODERATE PERSISTENT ASTHMA, UNSPECIFIED WHETHER COMPLICATED (HHS-HCC): ICD-10-CM

## 2024-05-13 ENCOUNTER — DOCUMENTATION (OUTPATIENT)
Dept: PRIMARY CARE | Facility: CLINIC | Age: 54
End: 2024-05-13
Payer: MEDICARE

## 2024-05-13 ENCOUNTER — TELEPHONE (OUTPATIENT)
Dept: PRIMARY CARE | Facility: CLINIC | Age: 54
End: 2024-05-13
Payer: MEDICARE

## 2024-05-13 DIAGNOSIS — R10.9 CHRONIC ABDOMINAL PAIN: ICD-10-CM

## 2024-05-13 DIAGNOSIS — G89.29 CHRONIC ABDOMINAL PAIN: ICD-10-CM

## 2024-05-13 DIAGNOSIS — K86.0 ALCOHOL-INDUCED CHRONIC PANCREATITIS (MULTI): ICD-10-CM

## 2024-05-13 RX ORDER — ALBUTEROL SULFATE 90 UG/1
2 AEROSOL, METERED RESPIRATORY (INHALATION) EVERY 4 HOURS PRN
Qty: 18 G | Refills: 3 | Status: SHIPPED | OUTPATIENT
Start: 2024-05-13

## 2024-05-13 RX ORDER — OXYCODONE HYDROCHLORIDE 10 MG/1
10 TABLET ORAL EVERY 4 HOURS PRN
Qty: 150 TABLET | Refills: 0 | Status: SHIPPED | OUTPATIENT
Start: 2024-05-13 | End: 2024-06-06 | Stop reason: SDUPTHER

## 2024-05-20 ENCOUNTER — TELEPHONE (OUTPATIENT)
Dept: ENDOCRINOLOGY | Facility: CLINIC | Age: 54
End: 2024-05-20

## 2024-05-20 ENCOUNTER — APPOINTMENT (OUTPATIENT)
Dept: ENDOCRINOLOGY | Facility: CLINIC | Age: 54
End: 2024-05-20
Payer: MEDICARE

## 2024-05-20 DIAGNOSIS — E10.65 TYPE 1 DIABETES MELLITUS WITH HYPERGLYCEMIA (MULTI): Primary | ICD-10-CM

## 2024-05-20 DIAGNOSIS — E78.2 MIXED HYPERLIPIDEMIA: ICD-10-CM

## 2024-06-06 DIAGNOSIS — G89.29 CHRONIC ABDOMINAL PAIN: ICD-10-CM

## 2024-06-06 DIAGNOSIS — K86.0 ALCOHOL-INDUCED CHRONIC PANCREATITIS (MULTI): ICD-10-CM

## 2024-06-06 DIAGNOSIS — R10.9 CHRONIC ABDOMINAL PAIN: ICD-10-CM

## 2024-06-06 RX ORDER — OXYCODONE HYDROCHLORIDE 10 MG/1
10 TABLET ORAL EVERY 4 HOURS PRN
Qty: 150 TABLET | Refills: 0 | Status: SHIPPED | OUTPATIENT
Start: 2024-06-06 | End: 2024-07-06

## 2024-07-04 DIAGNOSIS — E10.65 UNCONTROLLED TYPE 1 DIABETES MELLITUS WITH HYPERGLYCEMIA (MULTI): ICD-10-CM

## 2024-07-05 RX ORDER — ATORVASTATIN CALCIUM 10 MG/1
10 TABLET, FILM COATED ORAL DAILY
Qty: 90 TABLET | Refills: 3 | Status: SHIPPED | OUTPATIENT
Start: 2024-07-05

## 2024-07-11 DIAGNOSIS — F33.1 MDD (MAJOR DEPRESSIVE DISORDER), RECURRENT EPISODE, MODERATE (MULTI): ICD-10-CM

## 2024-07-11 DIAGNOSIS — J45.40 MODERATE PERSISTENT ASTHMA, UNSPECIFIED WHETHER COMPLICATED (HHS-HCC): ICD-10-CM

## 2024-07-11 RX ORDER — VENLAFAXINE HYDROCHLORIDE 75 MG/1
75 CAPSULE, EXTENDED RELEASE ORAL DAILY
Qty: 90 CAPSULE | Refills: 1 | Status: SHIPPED | OUTPATIENT
Start: 2024-07-11 | End: 2025-01-07

## 2024-07-11 RX ORDER — FLUTICASONE PROPIONATE AND SALMETEROL 250; 50 UG/1; UG/1
1 POWDER RESPIRATORY (INHALATION) 2 TIMES DAILY
Qty: 180 EACH | Refills: 1 | Status: SHIPPED | OUTPATIENT
Start: 2024-07-11

## 2024-07-12 DIAGNOSIS — K86.0 ALCOHOL-INDUCED CHRONIC PANCREATITIS (MULTI): ICD-10-CM

## 2024-07-12 DIAGNOSIS — R10.9 CHRONIC ABDOMINAL PAIN: ICD-10-CM

## 2024-07-12 DIAGNOSIS — G89.29 CHRONIC ABDOMINAL PAIN: ICD-10-CM

## 2024-07-12 RX ORDER — OXYCODONE HYDROCHLORIDE 10 MG/1
10 TABLET ORAL EVERY 4 HOURS PRN
Qty: 150 TABLET | Refills: 0 | Status: SHIPPED | OUTPATIENT
Start: 2024-07-12 | End: 2024-08-11

## 2024-07-18 ENCOUNTER — TELEPHONE (OUTPATIENT)
Dept: PRIMARY CARE | Facility: CLINIC | Age: 54
End: 2024-07-18
Payer: COMMERCIAL

## 2024-07-18 DIAGNOSIS — K04.7 DENTAL INFECTION: Primary | ICD-10-CM

## 2024-07-18 RX ORDER — AMOXICILLIN 875 MG/1
875 TABLET, FILM COATED ORAL 2 TIMES DAILY
Qty: 20 TABLET | Refills: 0 | Status: SHIPPED | OUTPATIENT
Start: 2024-07-18 | End: 2024-07-19 | Stop reason: ALTCHOICE

## 2024-07-19 ENCOUNTER — APPOINTMENT (OUTPATIENT)
Dept: PRIMARY CARE | Facility: CLINIC | Age: 54
End: 2024-07-19
Payer: MEDICARE

## 2024-07-19 VITALS
BODY MASS INDEX: 27.23 KG/M2 | SYSTOLIC BLOOD PRESSURE: 124 MMHG | HEART RATE: 86 BPM | DIASTOLIC BLOOD PRESSURE: 62 MMHG | HEIGHT: 62 IN | OXYGEN SATURATION: 98 % | WEIGHT: 148 LBS

## 2024-07-19 DIAGNOSIS — T46.6X5A STATIN-INDUCED MYOSITIS: ICD-10-CM

## 2024-07-19 DIAGNOSIS — K86.3 PANCREATIC PSEUDOCYST (HHS-HCC): ICD-10-CM

## 2024-07-19 DIAGNOSIS — F33.1 MDD (MAJOR DEPRESSIVE DISORDER), RECURRENT EPISODE, MODERATE (MULTI): ICD-10-CM

## 2024-07-19 DIAGNOSIS — K86.1 IDIOPATHIC CHRONIC PANCREATITIS (MULTI): ICD-10-CM

## 2024-07-19 DIAGNOSIS — G89.29 CHRONIC ABDOMINAL PAIN: ICD-10-CM

## 2024-07-19 DIAGNOSIS — J45.40 MODERATE PERSISTENT ASTHMA, UNSPECIFIED WHETHER COMPLICATED (HHS-HCC): ICD-10-CM

## 2024-07-19 DIAGNOSIS — M60.9 STATIN-INDUCED MYOSITIS: ICD-10-CM

## 2024-07-19 DIAGNOSIS — M25.531 RIGHT WRIST PAIN: ICD-10-CM

## 2024-07-19 DIAGNOSIS — R10.9 CHRONIC ABDOMINAL PAIN: ICD-10-CM

## 2024-07-19 DIAGNOSIS — E10.65 UNCONTROLLED TYPE 1 DIABETES MELLITUS WITH HYPERGLYCEMIA (MULTI): Primary | ICD-10-CM

## 2024-07-19 DIAGNOSIS — M75.81 TENDINITIS OF RIGHT ROTATOR CUFF: ICD-10-CM

## 2024-07-19 PROBLEM — K86.0 ALCOHOL-INDUCED CHRONIC PANCREATITIS (MULTI): Status: ACTIVE | Noted: 2024-07-19

## 2024-07-19 PROCEDURE — 4010F ACE/ARB THERAPY RXD/TAKEN: CPT | Performed by: FAMILY MEDICINE

## 2024-07-19 PROCEDURE — 99214 OFFICE O/P EST MOD 30 MIN: CPT | Performed by: FAMILY MEDICINE

## 2024-07-19 PROCEDURE — 3078F DIAST BP <80 MM HG: CPT | Performed by: FAMILY MEDICINE

## 2024-07-19 PROCEDURE — 3074F SYST BP LT 130 MM HG: CPT | Performed by: FAMILY MEDICINE

## 2024-07-19 PROCEDURE — 3008F BODY MASS INDEX DOCD: CPT | Performed by: FAMILY MEDICINE

## 2024-07-19 RX ADMIN — METHYLPREDNISOLONE ACETATE 40 MG: 40 INJECTION, SUSPENSION INTRA-ARTICULAR; INTRALESIONAL; INTRAMUSCULAR; SOFT TISSUE at 16:22

## 2024-07-19 ASSESSMENT — LIFESTYLE VARIABLES: TOTAL SCORE: 1

## 2024-07-19 NOTE — PROGRESS NOTES
Subjective   Patient ID: Laxmi Sousa is a 54 y.o. female who presents for Injections (Cortisone injection right wrist and shoulder /Medication follow up ).  HPI  Got the pump- says the sugars are good  No CP, SOB, palpitations, HA, vision changes  Gets numbness in hands and legs- on gabapentin  No dizziness    Mood is good with the venlafaxine  No hopeless, worthless  Appetite too good  Sleep good  No SI/HI  No ernesto, racing thoughts     Pain in left flank pain- oxycodone helps with the pain (some times can get away doing only 30 mg instead of 40 mg)  Sharp, achy pain  Worse- certain movement  No n/v  Diarrhea better since back on the creon     Right shoulder bothering her  Right wrist pain  Achy pain  Worse- sleeping on shoulder  Better- not doing anything with it  No swelling or bruising     Ophtho- 9/23  Dentist- keeping up with  [place]-   Colonoscopy- 3/18 (repeat 3 years)- will be calling [facility]-  UA/Micro- 9/23  Mammo- 9/23  DEXA- 11/22  PAP- 2015- will see GYN  Lung CT-  AAA-  EKG-  Pneumovax- 2013  Prevnar-  Flu- 9/22  Shingrix-  Td-   Advance Directives-  CV risk-  ETOH screen- 1/12/24  AWV- 1/12/24   MDD screen- 1/12/24    OARRS:  Neil Louie DO on 7/19/2024  4:25 PM  I have personally reviewed the OARRS report for Laxmi Sousa. I have considered the risks of abuse, dependence, addiction and diversion and I believe that it is clinically appropriate for Laxmi Sousa to be prescribed this medication    Is the patient prescribed a combination of a benzodiazepine and opioid?  No    Last Urine Drug Screen / ordered today: No  Recent Results (from the past 8760 hour(s))   Opiate Confirmation, Urine    Collection Time: 09/15/23  4:17 PM   Result Value Ref Range    6-Acetylmorphine <25 Cutoff <25 ng/mL    Codeine <50 Cutoff <50 ng/mL    Hydrocodone <25 Cutoff <25 ng/mL    Hydromorphone <25 Cutoff <25 ng/mL    Morphine Urine <50 Cutoff <50 ng/mL    Norhydrocodone <25 Cutoff <25 ng/mL     Noroxycodone >1000 (A) Cutoff <25 ng/mL    Oxycodone 925 (A) Cutoff <25 ng/mL    Oxymorphone 534 (A) Cutoff <25 ng/mL   Drug Screen, Urine With Reflex to Confirmation    Collection Time: 09/15/23  4:17 PM   Result Value Ref Range    DRUG SCREEN COMMENT URINE SEE BELOW     Amphetamine Screen, Urine PRESUMPTIVE NEGATIVE NEGATIVE    Barbiturate Screen, Urine PRESUMPTIVE NEGATIVE NEGATIVE    BENZODIAZEPINE (PRESENCE) IN URINE BY SCREEN METHOD PRESUMPTIVE NEGATIVE NEGATIVE    Cannabinoid Screen, Urine PRESUMPTIVE NEGATIVE NEGATIVE    Cocaine Screen, Urine PRESUMPTIVE NEGATIVE NEGATIVE    Fentanyl, Ur PRESUMPTIVE NEGATIVE NEGATIVE    Opiate Screen, Urine PRESUMPTIVE NEGATIVE NEGATIVE    Oxycodone Screen, Ur PRESUMPTIVE POSITIVE (A) NEGATIVE    PCP Screen, Urine PRESUMPTIVE NEGATIVE NEGATIVE     Results are as expected.         Controlled Substance Agreement:  Date of the Last Agreement: 23  Reviewed Controlled Substance Agreement including but not limited to the benefits, risks, and alternatives to treatment with a Controlled Substance medication(s).    Opioids:  What is the patient's goal of therapy? Able to do hobbies  Is this being achieved with current treatment? Yes    I have calculated the patient's Morphine Dose Equivalent (MED):   I have considered referral to Pain Management and/or a specialist, and do not feel it is necessary at this time.    I feel that it is clinically indicated to continue this current medication regimen after consideration of alternative therapies, and other non-opioid treatment.    Opioid Risk Screening:  Family History of Substance Abuse  Alcohol: 0 (2024  4:00 PM)  Illegal Dru (2024  4:00 PM)  Prescription Dru (2024  4:00 PM)    Personal History of Substance Abuse  Alcohol: 0 (2024  4:00 PM)  Illegal Drugs: 0 (2024  4:00 PM)  Prescription Drugs: 0 (2024  4:00 PM)    Patient Age (16-45)  Age (16-45): 0 (2024  4:00 PM)    History of  Preadolescent Sexual Abuse  History of Preadolescent Sexual Abuse: 0 (7/19/2024  4:00 PM)    Psychological Disease  Attention Deficit Disorder, Obsessive Compulsive Disorder, Bipolar, Schizophrenia: 0 (7/19/2024  4:00 PM)  Depression: 1 (7/19/2024  4:00 PM)    Total Score  Total Score: 1 (7/19/2024  4:00 PM)    Total Score Risk Category  TOTAL SCORE CATEGORY: Low Risk (0-3) (7/19/2024  4:00 PM)        Pain Assessment:  No data recorded      Current Outpatient Medications:     albuterol 90 mcg/actuation inhaler, INHALE 2 PUFFS EVERY 4 HOURS IF NEEDED FOR SHORTNESS OF BREATH., Disp: 18 g, Rfl: 3    anastrozole (Arimidex) 1 mg tablet, Take 1 tablet (1 mg total) by mouth once daily., Disp: , Rfl:     blood-glucose meter misc, UAD, Disp: 1 each, Rfl: 0    Dexcom G6 Sensor device, CHANGE every 10 DAYS as directed, Disp: , Rfl:     Dexcom G6 Transmitter device, Inject under the skin if needed., Disp: , Rfl:     diclofenac sodium (Voltaren) 1 % gel, APPLY 0.5 APPLICATIONS TOPICALLY 4 TIMES A DAY AS NEEDED (JOINT PAIN)., Disp: 300 g, Rfl: 1    esomeprazole (NexIUM) 40 mg DR capsule, Take 1 capsule (40 mg) by mouth once daily in the morning. Take before meals., Disp: , Rfl:     fluticasone (Flonase) 50 mcg/actuation nasal spray, Administer 2 sprays into each nostril once daily., Disp: 48 g, Rfl: 3    fluticasone propion-salmeteroL (Advair Diskus) 250-50 mcg/dose diskus inhaler, INHALE 1 PUFF BY MOUTH 2 TIMES A DAY., Disp: 180 each, Rfl: 1    gabapentin (Neurontin) 800 mg tablet, Take 1 tablet (800 mg) by mouth 4 times a day., Disp: 360 tablet, Rfl: 1    insulin lispro (HumaLOG U-100 Insulin) 100 unit/mL injection, INJECT AS DIRECTED UP TO 80 UNITS DAILY IN INSULIN PUMP, Disp: 80 mL, Rfl: 1    insulin lispro (HumaLOG) 100 unit/mL injection, INJECT AS DIRECTED UP TO 80 UNITS DAILY IN INSULIN PUMP, Disp: 80 mL, Rfl: 1    lipase-protease-amylase (Creon) 24,000-76,000 -120,000 unit capsule, 3 po ac and 2 po before snacks, Disp:  1260 capsule, Rfl: 1    lisinopril 2.5 mg tablet, Take 1 tablet (2.5 mg) by mouth once daily., Disp: 90 tablet, Rfl: 3    loratadine (Claritin) 10 mg tablet, Take 1 tablet (10 mg) by mouth once daily., Disp: 90 tablet, Rfl: 3    naloxone (Narcan) 4 mg/0.1 mL nasal spray, Administer 1 spray (4 mg) into affected nostril(s) if needed for opioid reversal. May repeat every 2-3 minutes if needed, alternating nostrils, until medical assistance becomes available., Disp: 2 each, Rfl: 1    ondansetron (Zofran) 4 mg tablet, Take 2 tablets (8 mg) by mouth every 12 hours if needed for vomiting or nausea., Disp: 20 tablet, Rfl: 2    OneTouch Delica Plus Lancet 33 gauge misc, As directed to check blood sugar daily, Disp: 100 each, Rfl: 1    OneTouch Verio test strips strip, As directed to check blood sugar daily, Disp: 100 strip, Rfl: 1    oxyCODONE (Roxicodone) 10 mg immediate release tablet, Take 1 tablet (10 mg) by mouth every 4 hours if needed for severe pain (7 - 10)., Disp: 150 tablet, Rfl: 0    pantoprazole (ProtoNix) 40 mg EC tablet, Take 1 tablet (40 mg) by mouth once daily in the morning. Take before meals., Disp: , Rfl:     traZODone (Desyrel) 50 mg tablet, TAKE 1-3 TABLETS BY MOUTH AT BEDTIME AS NEEDED FOR ANXIETY, Disp: 270 tablet, Rfl: 1    venlafaxine XR (Effexor-XR) 75 mg 24 hr capsule, TAKE 1 CAPSULE (75 MG) BY MOUTH ONCE DAILY. TAKE WITH FOOD., Disp: 90 capsule, Rfl: 1    hydrOXYzine HCL (Atarax) 50 mg tablet, Take 1 tablet (50 mg) by mouth 4 times a day., Disp: , Rfl:     triamterene-hydrochlorothiazid (Maxzide-25) 37.5-25 mg tablet, Take 1 tablet by mouth once daily., Disp: , Rfl:    Past Surgical History:   Procedure Laterality Date    BREAST LUMPECTOMY W/ NEEDLE LOCALIZATION Right 07/20/2022    [place] lymph node excisional biopsy.    CHOLECYSTECTOMY  07/05/2013    Cholecystectomy Laparoscopic    CYSTOSCOPY      URETHRAL DILATION    LITHOTRIPSY  07/05/2013    Renal Lithotripsy    OTHER SURGICAL HISTORY   2013    Endoscopic Retrograde Cholangiopancreatography (ERCP)    OTHER SURGICAL HISTORY  2019     section    OTHER SURGICAL HISTORY  2014    ERCP With Change Of Tube/Stent    OTHER SURGICAL HISTORY  2015    Incision And Drainage Of Skin Abscess Buttocks    TONSILLECTOMY  2018    Tonsillectomy    VENTRAL HERNIA REPAIR  2018    Ventral Hernia Repair      Past Medical History:   Diagnosis Date    Acute actinic otitis externa, left ear 2020    Acute actinic otitis externa of left ear    Acute candidiasis of vulva and vagina 2016    Vaginitis due to Candida albicans    Acute mastoiditis without complications, left ear 2020    Acute mastoiditis of left side    Acute serous otitis media, left ear 2020    Non-recurrent acute serous otitis media of left ear    Acute suppurative otitis media without spontaneous rupture of ear drum, left ear 2020    Non-recurrent acute suppurative otitis media of left ear without spontaneous rupture of tympanic membrane    Anxiety and depression     Asthma ([facility])     Chronic pancreatitis (Multi)     Cutaneous abscess of buttock 2015    Abscess of buttock, right    Diabetic neuropathy (Multi)     Diverticular disease of colon     DM type 1 (diabetes mellitus, type 1) (Multi)     Encounter for debridement of left postmastoidectomy cavity 2023    Enlarged lymph node 2023    Fatigue 2023    GERD (gastroesophageal reflux disease)     Hidradenitis suppurativa     History of lumpectomy of right breast 2022    History of right breast cancer     Ductal carcinoma in situ (DCIS)    LFT elevation     Other chronic nonsuppurative otitis media, left ear 2021    Other chronic nonsuppurative otitis media of left ear    Other chronic suppurative otitis media, left ear 2020    Chronic suppurative otitis media of left ear, unspecified otitis media location    Other obesity due to excess calories  "03/31/2020    Class 1 obesity due to excess calories with serious comorbidity and body mass index (BMI) of 30.0 to 30.9 in adult    Personal history of other diseases of the digestive system     History of chronic pancreatitis    Personal history of other diseases of the respiratory system 02/24/2021    History of acute bronchitis    Personal history of other diseases of the respiratory system 01/23/2017    History of acute sinusitis    Personal history of other mental and behavioral disorders 07/07/2017    History of depression    Personal history of other specified conditions 08/28/2019    History of painful urination    Stress-induced cardiomyopathy 04/04/2023    Swimmer's ear, left ear 04/28/2020    Acute swimmer's ear of left side    Type 1 diabetes mellitus with hyperglycemia (Multi)     Urethral stricture     Viral conjunctivitis, unspecified 07/16/2021    Acute viral conjunctivitis, unspecified laterality    Weak urinary stream 04/04/2023     Social History     Tobacco Use    Smoking status: Every Day     Current packs/day: 0.50     Types: Cigarettes    Smokeless tobacco: Never   Vaping Use    Vaping status: Never Used   Substance Use Topics    Alcohol use: Never    Drug use: Never      No family history on file.   Review of Systems    Objective   /62   Pulse 86   Ht 1.575 m (5' 2\")   Wt 67.1 kg (148 lb)   LMP  (LMP Unknown)   SpO2 98%   BMI 27.07 kg/m²    Physical Exam  Vitals and nursing note reviewed.   Constitutional:       General: She is not in acute distress.     Appearance: Normal appearance.   HENT:      Head: Normocephalic and atraumatic.   Eyes:      Extraocular Movements: Extraocular movements intact.      Conjunctiva/sclera: Conjunctivae normal.      Pupils: Pupils are equal, round, and reactive to light.   Neck:      Vascular: No carotid bruit.   Cardiovascular:      Rate and Rhythm: Normal rate and regular rhythm.      Pulses: Normal pulses.      Heart sounds: Normal heart sounds. " No murmur heard.  Pulmonary:      Effort: Pulmonary effort is normal.      Breath sounds: Normal breath sounds.   Abdominal:      General: Abdomen is flat. Bowel sounds are normal.      Palpations: Abdomen is soft. There is no mass.      Tenderness: There is generalized abdominal tenderness.   Musculoskeletal:         General: Normal range of motion.      Cervical back: Normal range of motion and neck supple.      Comments: TTP over anterior right shoulder with positive impingement in shoulder  Negative drop arm  No decreased ROM    TTP around right wrist  No erythema  Minimal swelling  No laxity   Lymphadenopathy:      Cervical: No cervical adenopathy.   Skin:     Capillary Refill: Capillary refill takes less than 2 seconds.   Neurological:      General: No focal deficit present.      Mental Status: She is alert and oriented to person, place, and time.      Cranial Nerves: No cranial nerve deficit.      Motor: No weakness.      Deep Tendon Reflexes: Reflexes normal.   Psychiatric:         Mood and Affect: Mood normal.         Behavior: Behavior normal.     Joint Injection Large/Arthrocentesis: R glenohumeral on 7/21/2024 9:44 PM  Indications: pain  Details: 25 G needle, posterior approach  Medications: 3 mL lidocaine 10 mg/mL (1 %)  Outcome: tolerated well, no immediate complications  Consent was given by the patient. Immediately prior to procedure a time out was called to verify the correct patient, procedure, equipment, support staff and site/side marked as required. Patient was prepped and draped in the usual sterile fashion.       Joint Injection Medium/Arthrocentesis: R distal radioulnar on 7/21/2024 9:47 PM  Indications: pain  Details: 25 G needle, dorsal approach  Medications: 20 mg triamcinolone acetonide 40 mg/mL; 1 mL lidocaine 10 mg/mL (1 %)  Outcome: tolerated well, no immediate complications  Consent was given by the patient. Immediately prior to procedure a time out was called to verify the correct  patient, procedure, equipment, support staff and site/side marked as required. Patient was prepped and draped in the usual sterile fashion.           Assessment/Plan   Problem List Items Addressed This Visit       Uncontrolled type 1 diabetes mellitus with hyperglycemia (Multi) - Primary    Statin-induced myositis    Pancreatic pseudocyst ([facility])    MDD (major depressive disorder), recurrent episode, moderate (Multi)    Idiopathic chronic pancreatitis (Multi)    Chronic abdominal pain    Asthma ([facility])     Other Visit Diagnoses       Right wrist pain        Tendinitis of right rotator cuff            Renewed/continued rest of medications     Updated Health Maintenance in HPI section     Hyperlipidemia- continue meds, low fat/cholesterol diet     Cardiomyopathy- f/u with cardiology     Neuropathy- gabapentin 900 4 times a day, control BS     MDD with anxiety- continue Effexor, Wellbutrin, hydroxyzine 50 mg prn     Chronic Abdominal pain/pancreatitis- continue oxycodone, gabapentin 800 mg 4 times a day     DM Type 1 with neuropathy- continue meds, referred to endo, [facility]     Vitamin B12 def- check level, supplement     Microalbuminuria- lisinopril, control BS     Asthma- advair, albuterol prn, allergen avoidance     Breast Cancer- F/U with oncology     Exocrine Pancreatic Insufficiency- restart creon, limit fatty foods     Wrist Pain- RICE, cortisone injection    Shoulder tendinitis- cortisone injection     F/U 3 months     Patient understands and agrees with treatment plan    Neil Louie DO

## 2024-07-20 DIAGNOSIS — E10.65 TYPE 1 DIABETES MELLITUS WITH HYPERGLYCEMIA (MULTI): ICD-10-CM

## 2024-07-21 PROCEDURE — 20605 DRAIN/INJ JOINT/BURSA W/O US: CPT | Performed by: FAMILY MEDICINE

## 2024-07-21 PROCEDURE — 20610 DRAIN/INJ JOINT/BURSA W/O US: CPT | Performed by: FAMILY MEDICINE

## 2024-07-21 RX ORDER — TRIAMCINOLONE ACETONIDE 40 MG/ML
20 INJECTION, SUSPENSION INTRA-ARTICULAR; INTRAMUSCULAR
Status: COMPLETED | OUTPATIENT
Start: 2024-07-21 | End: 2024-07-21

## 2024-07-21 RX ORDER — LIDOCAINE HYDROCHLORIDE 10 MG/ML
3 INJECTION INFILTRATION; PERINEURAL
Status: COMPLETED | OUTPATIENT
Start: 2024-07-21 | End: 2024-07-21

## 2024-07-21 RX ORDER — METHYLPREDNISOLONE ACETATE 40 MG/ML
40 INJECTION, SUSPENSION INTRA-ARTICULAR; INTRALESIONAL; INTRAMUSCULAR; SOFT TISSUE ONCE
Status: COMPLETED | OUTPATIENT
Start: 2024-07-21 | End: 2024-07-19

## 2024-07-21 RX ORDER — LIDOCAINE HYDROCHLORIDE 10 MG/ML
1 INJECTION INFILTRATION; PERINEURAL
Status: COMPLETED | OUTPATIENT
Start: 2024-07-21 | End: 2024-07-21

## 2024-07-21 RX ADMIN — LIDOCAINE HYDROCHLORIDE 3 ML: 10 INJECTION INFILTRATION; PERINEURAL at 21:44

## 2024-07-21 RX ADMIN — TRIAMCINOLONE ACETONIDE 20 MG: 40 INJECTION, SUSPENSION INTRA-ARTICULAR; INTRAMUSCULAR at 21:47

## 2024-07-21 RX ADMIN — LIDOCAINE HYDROCHLORIDE 1 ML: 10 INJECTION INFILTRATION; PERINEURAL at 21:47

## 2024-07-22 RX ORDER — LANCETS 33 GAUGE
EACH MISCELLANEOUS
Qty: 100 EACH | Refills: 6 | Status: SHIPPED | OUTPATIENT
Start: 2024-07-22

## 2024-08-02 ENCOUNTER — TELEPHONE (OUTPATIENT)
Dept: PRIMARY CARE | Facility: CLINIC | Age: 54
End: 2024-08-02
Payer: COMMERCIAL

## 2024-08-02 DIAGNOSIS — K04.7 DENTAL INFECTION: Primary | ICD-10-CM

## 2024-08-02 RX ORDER — CLINDAMYCIN HYDROCHLORIDE 300 MG/1
300 CAPSULE ORAL 2 TIMES DAILY
Qty: 20 CAPSULE | Refills: 0 | Status: SHIPPED | OUTPATIENT
Start: 2024-08-02 | End: 2024-08-12

## 2024-08-03 ENCOUNTER — HOSPITAL ENCOUNTER (EMERGENCY)
Facility: HOSPITAL | Age: 54
Discharge: HOME | End: 2024-08-03
Attending: EMERGENCY MEDICINE
Payer: COMMERCIAL

## 2024-08-03 VITALS
BODY MASS INDEX: 25.76 KG/M2 | SYSTOLIC BLOOD PRESSURE: 148 MMHG | OXYGEN SATURATION: 97 % | HEIGHT: 62 IN | HEART RATE: 98 BPM | RESPIRATION RATE: 16 BRPM | WEIGHT: 140 LBS | DIASTOLIC BLOOD PRESSURE: 88 MMHG | TEMPERATURE: 98.4 F

## 2024-08-03 DIAGNOSIS — K04.7 DENTAL ABSCESS: Primary | ICD-10-CM

## 2024-08-03 PROCEDURE — 2500000001 HC RX 250 WO HCPCS SELF ADMINISTERED DRUGS (ALT 637 FOR MEDICARE OP): Performed by: EMERGENCY MEDICINE

## 2024-08-03 PROCEDURE — 41800 DRAINAGE OF GUM LESION: CPT

## 2024-08-03 PROCEDURE — 99282 EMERGENCY DEPT VISIT SF MDM: CPT

## 2024-08-03 RX ORDER — LIDOCAINE HYDROCHLORIDE 20 MG/ML
1.25 SOLUTION OROPHARYNGEAL ONCE
Status: COMPLETED | OUTPATIENT
Start: 2024-08-03 | End: 2024-08-03

## 2024-08-03 RX ADMIN — LIDOCAINE HYDROCHLORIDE 1.25 ML: 20 SOLUTION ORAL at 05:24

## 2024-08-03 ASSESSMENT — LIFESTYLE VARIABLES
HAVE YOU EVER FELT YOU SHOULD CUT DOWN ON YOUR DRINKING: NO
TOTAL SCORE: 0
EVER FELT BAD OR GUILTY ABOUT YOUR DRINKING: NO
HAVE PEOPLE ANNOYED YOU BY CRITICIZING YOUR DRINKING: NO
EVER HAD A DRINK FIRST THING IN THE MORNING TO STEADY YOUR NERVES TO GET RID OF A HANGOVER: NO

## 2024-08-03 ASSESSMENT — COLUMBIA-SUICIDE SEVERITY RATING SCALE - C-SSRS
1. IN THE PAST MONTH, HAVE YOU WISHED YOU WERE DEAD OR WISHED YOU COULD GO TO SLEEP AND NOT WAKE UP?: NO
2. HAVE YOU ACTUALLY HAD ANY THOUGHTS OF KILLING YOURSELF?: NO
6. HAVE YOU EVER DONE ANYTHING, STARTED TO DO ANYTHING, OR PREPARED TO DO ANYTHING TO END YOUR LIFE?: NO

## 2024-08-03 ASSESSMENT — PAIN DESCRIPTION - ORIENTATION: ORIENTATION: LEFT;LOWER

## 2024-08-03 ASSESSMENT — PAIN SCALES - GENERAL: PAINLEVEL_OUTOF10: 6

## 2024-08-03 ASSESSMENT — PAIN DESCRIPTION - LOCATION: LOCATION: TEETH

## 2024-08-03 ASSESSMENT — PAIN - FUNCTIONAL ASSESSMENT: PAIN_FUNCTIONAL_ASSESSMENT: 0-10

## 2024-08-03 NOTE — ED PROVIDER NOTES
HPI   Chief Complaint   Patient presents with    Dental Pain     Pt states she is getting all of her bottom teeth pulled this upcoming Monday for dentures. She was put on amoxicillin for 10 days, finished the prescription. Yesterday the pain and swelling came back and her PCP started her on another antibiotic, she has taken 2 doses today.        54-year-old female here for chief complaint of a dental abscess.  Patient states that she is getting dentures on Monday which is in 2 days and she started having fullness to the left lower jaw area.  She states she has had this before and it was an abscess.  She would like me to drain it.  No fevers or chills are noted.              Patient History   Past Medical History:   Diagnosis Date    Acute actinic otitis externa, left ear 08/17/2020    Acute actinic otitis externa of left ear    Acute candidiasis of vulva and vagina 04/06/2016    Vaginitis due to Candida albicans    Acute mastoiditis without complications, left ear 08/21/2020    Acute mastoiditis of left side    Acute serous otitis media, left ear 08/17/2020    Non-recurrent acute serous otitis media of left ear    Acute suppurative otitis media without spontaneous rupture of ear drum, left ear 08/17/2020    Non-recurrent acute suppurative otitis media of left ear without spontaneous rupture of tympanic membrane    Anxiety and depression     Asthma ([facility])     Chronic pancreatitis (Multi)     Cutaneous abscess of buttock 04/08/2015    Abscess of buttock, right    Diabetic neuropathy (Multi)     Diverticular disease of colon     DM type 1 (diabetes mellitus, type 1) (Multi)     Encounter for debridement of left postmastoidectomy cavity 04/04/2023    Enlarged lymph node 04/04/2023    Fatigue 04/04/2023    GERD (gastroesophageal reflux disease)     Hidradenitis suppurativa     History of lumpectomy of right breast 07/20/2022    History of right breast cancer     Ductal carcinoma in situ (DCIS)    LFT elevation      Other chronic nonsuppurative otitis media, left ear 2021    Other chronic nonsuppurative otitis media of left ear    Other chronic suppurative otitis media, left ear 2020    Chronic suppurative otitis media of left ear, unspecified otitis media location    Other obesity due to excess calories 2020    Class 1 obesity due to excess calories with serious comorbidity and body mass index (BMI) of 30.0 to 30.9 in adult    Personal history of other diseases of the digestive system     History of chronic pancreatitis    Personal history of other diseases of the respiratory system 2021    History of acute bronchitis    Personal history of other diseases of the respiratory system 2017    History of acute sinusitis    Personal history of other mental and behavioral disorders 2017    History of depression    Personal history of other specified conditions 2019    History of painful urination    Stress-induced cardiomyopathy 2023    Swimmer's ear, left ear 2020    Acute swimmer's ear of left side    Type 1 diabetes mellitus with hyperglycemia (Multi)     Urethral stricture     Viral conjunctivitis, unspecified 2021    Acute viral conjunctivitis, unspecified laterality    Weak urinary stream 2023     Past Surgical History:   Procedure Laterality Date    BREAST LUMPECTOMY W/ NEEDLE LOCALIZATION Right 2022    [place] lymph node excisional biopsy.    CHOLECYSTECTOMY  2013    Cholecystectomy Laparoscopic    CYSTOSCOPY      URETHRAL DILATION    LITHOTRIPSY  2013    Renal Lithotripsy    OTHER SURGICAL HISTORY  2013    Endoscopic Retrograde Cholangiopancreatography (ERCP)    OTHER SURGICAL HISTORY  2019     section    OTHER SURGICAL HISTORY  2014    ERCP With Change Of Tube/Stent    OTHER SURGICAL HISTORY  2015    Incision And Drainage Of Skin Abscess Buttocks    TONSILLECTOMY  2018    Tonsillectomy    VENTRAL HERNIA  REPAIR  03/28/2018    Ventral Hernia Repair     No family history on file.  Social History     Tobacco Use    Smoking status: Every Day     Current packs/day: 0.50     Types: Cigarettes    Smokeless tobacco: Never   Vaping Use    Vaping status: Never Used   Substance Use Topics    Alcohol use: Never    Drug use: Never       Physical Exam   ED Triage Vitals [08/03/24 0350]   Temperature Heart Rate Respirations BP   36.9 °C (98.4 °F) (!) 118 18 156/90      Pulse Ox Temp Source Heart Rate Source Patient Position   97 % Temporal Monitor Sitting      BP Location FiO2 (%)     Left arm --       Physical Exam  Vitals and nursing note reviewed.   Constitutional:       Appearance: Normal appearance.   HENT:      Head: Normocephalic and atraumatic.      Nose: Nose normal.      Mouth/Throat:      Comments: There are multiple missing teeth multiple decayed teeth that are sharp in nature and there is fullness to the left lower gum area consistent with abscess formation  Eyes:      Extraocular Movements: Extraocular movements intact.      Pupils: Pupils are equal, round, and reactive to light.   Cardiovascular:      Rate and Rhythm: Normal rate and regular rhythm.   Pulmonary:      Effort: Pulmonary effort is normal.      Breath sounds: Normal breath sounds.   Abdominal:      General: Abdomen is flat.      Palpations: Abdomen is soft.   Musculoskeletal:         General: Normal range of motion.      Cervical back: Normal range of motion.   Skin:     General: Skin is warm and dry.      Capillary Refill: Capillary refill takes less than 2 seconds.   Neurological:      General: No focal deficit present.      Mental Status: She is alert.   Psychiatric:         Mood and Affect: Mood normal.           ED Course & MDM   Diagnoses as of 08/07/24 5885   Dental abscess                       No data recorded                      Medical Decision Making  Medical Decision Making: An 11 blade was taken and a's very small incision was made in the  gumline left lower jaw.  Copious amounts of pus was expressed.  Patient will be placed on antibiotics and given lidocaine solution for pain and close follow-up on Monday.  Patient agrees with plan of care.  On repeat evaluation the swelling has gone down tremendously.  The patient feels much better at this time.  Differential includes parotitis saladenitis, abscess    @GAB@     Candi Clark D.O.  Emergency Medicine          Procedure  Procedures     Candi Clark,   08/07/24 1495

## 2024-08-07 DIAGNOSIS — E10.65 TYPE 1 DIABETES MELLITUS WITH HYPERGLYCEMIA (MULTI): ICD-10-CM

## 2024-08-07 RX ORDER — BLOOD-GLUCOSE METER
EACH MISCELLANEOUS
Qty: 100 STRIP | Refills: 1 | Status: SHIPPED | OUTPATIENT
Start: 2024-08-07 | End: 2024-08-09

## 2024-08-09 DIAGNOSIS — E10.65 TYPE 1 DIABETES MELLITUS WITH HYPERGLYCEMIA (MULTI): ICD-10-CM

## 2024-08-09 RX ORDER — BLOOD-GLUCOSE METER
EACH MISCELLANEOUS
Qty: 100 STRIP | Refills: 1 | Status: SHIPPED | OUTPATIENT
Start: 2024-08-09

## 2024-08-12 DIAGNOSIS — R10.9 CHRONIC ABDOMINAL PAIN: ICD-10-CM

## 2024-08-12 DIAGNOSIS — K86.0 ALCOHOL-INDUCED CHRONIC PANCREATITIS (MULTI): ICD-10-CM

## 2024-08-12 DIAGNOSIS — G89.29 CHRONIC ABDOMINAL PAIN: ICD-10-CM

## 2024-08-12 DIAGNOSIS — E10.65 UNCONTROLLED TYPE 1 DIABETES MELLITUS WITH HYPERGLYCEMIA (MULTI): ICD-10-CM

## 2024-08-12 RX ORDER — OXYCODONE HYDROCHLORIDE 10 MG/1
10 TABLET ORAL EVERY 4 HOURS PRN
Qty: 150 TABLET | Refills: 0 | Status: SHIPPED | OUTPATIENT
Start: 2024-08-12 | End: 2024-09-11

## 2024-08-12 RX ORDER — DEXTROSE 4 G
TABLET,CHEWABLE ORAL
Qty: 1 EACH | Refills: 0 | Status: SHIPPED | OUTPATIENT
Start: 2024-08-12

## 2024-08-13 DIAGNOSIS — Z12.31 BREAST CANCER SCREENING BY MAMMOGRAM: ICD-10-CM

## 2024-09-09 ENCOUNTER — HOSPITAL ENCOUNTER (OUTPATIENT)
Dept: RADIOLOGY | Facility: HOSPITAL | Age: 54
Discharge: HOME | End: 2024-09-09
Payer: COMMERCIAL

## 2024-09-09 VITALS — HEIGHT: 62 IN | BODY MASS INDEX: 25.76 KG/M2 | WEIGHT: 140 LBS

## 2024-09-09 DIAGNOSIS — Z12.31 BREAST CANCER SCREENING BY MAMMOGRAM: ICD-10-CM

## 2024-09-09 PROCEDURE — 77067 SCR MAMMO BI INCL CAD: CPT | Performed by: RADIOLOGY

## 2024-09-09 PROCEDURE — 77063 BREAST TOMOSYNTHESIS BI: CPT | Performed by: RADIOLOGY

## 2024-09-09 PROCEDURE — 77067 SCR MAMMO BI INCL CAD: CPT

## 2024-09-11 DIAGNOSIS — R92.8 ABNORMAL MAMMOGRAM: ICD-10-CM

## 2024-09-23 ENCOUNTER — HOSPITAL ENCOUNTER (OUTPATIENT)
Dept: RADIOLOGY | Facility: HOSPITAL | Age: 54
Discharge: HOME | End: 2024-09-23
Payer: COMMERCIAL

## 2024-09-23 DIAGNOSIS — R92.8 ABNORMAL MAMMOGRAM: ICD-10-CM

## 2024-09-23 PROCEDURE — 76642 ULTRASOUND BREAST LIMITED: CPT | Mod: RT

## 2024-09-23 PROCEDURE — 76982 USE 1ST TARGET LESION: CPT | Mod: RT

## 2024-09-30 DIAGNOSIS — E11.40 DIABETIC NEUROPATHY, PAINFUL (MULTI): ICD-10-CM

## 2024-09-30 DIAGNOSIS — G89.29 CHRONIC ABDOMINAL PAIN: ICD-10-CM

## 2024-09-30 DIAGNOSIS — K86.0 ALCOHOL-INDUCED CHRONIC PANCREATITIS (MULTI): ICD-10-CM

## 2024-09-30 DIAGNOSIS — J45.40 MODERATE PERSISTENT ASTHMA, UNSPECIFIED WHETHER COMPLICATED (HHS-HCC): ICD-10-CM

## 2024-09-30 DIAGNOSIS — R10.9 CHRONIC ABDOMINAL PAIN: ICD-10-CM

## 2024-09-30 RX ORDER — GABAPENTIN 800 MG/1
800 TABLET ORAL 4 TIMES DAILY
Qty: 360 TABLET | Refills: 1 | Status: SHIPPED | OUTPATIENT
Start: 2024-09-30 | End: 2025-03-29

## 2024-09-30 RX ORDER — ALBUTEROL SULFATE 90 UG/1
2 INHALANT RESPIRATORY (INHALATION) EVERY 4 HOURS PRN
Qty: 18 G | Refills: 3 | Status: SHIPPED | OUTPATIENT
Start: 2024-09-30

## 2024-09-30 RX ORDER — OXYCODONE HYDROCHLORIDE 10 MG/1
10 TABLET ORAL EVERY 4 HOURS PRN
Qty: 150 TABLET | Refills: 0 | Status: SHIPPED | OUTPATIENT
Start: 2024-09-30 | End: 2024-10-30

## 2024-09-30 RX ORDER — FLUTICASONE PROPIONATE AND SALMETEROL 250; 50 UG/1; UG/1
1 POWDER RESPIRATORY (INHALATION) 2 TIMES DAILY
Qty: 180 EACH | Refills: 1 | Status: SHIPPED | OUTPATIENT
Start: 2024-09-30

## 2024-10-10 ENCOUNTER — APPOINTMENT (OUTPATIENT)
Dept: SURGERY | Facility: CLINIC | Age: 54
End: 2024-10-10
Payer: COMMERCIAL

## 2024-10-21 DIAGNOSIS — E10.65 UNCONTROLLED TYPE 1 DIABETES MELLITUS WITH HYPERGLYCEMIA: ICD-10-CM

## 2024-10-21 RX ORDER — DEXTROSE 4 G
TABLET,CHEWABLE ORAL
Qty: 1 EACH | Refills: 0 | Status: SHIPPED | OUTPATIENT
Start: 2024-10-21

## 2024-10-23 ENCOUNTER — TELEPHONE (OUTPATIENT)
Dept: ENDOCRINOLOGY | Facility: CLINIC | Age: 54
End: 2024-10-23
Payer: COMMERCIAL

## 2024-10-23 NOTE — TELEPHONE ENCOUNTER
Pt called in because supplies from [facility] are running out next week she has not been able to come to appointments to due to family complications and next appt is in January Pt wants to know what she can do in the mean time because she needs her supplies    Please advise

## 2024-10-24 ENCOUNTER — TELEPHONE (OUTPATIENT)
Facility: CLINIC | Age: 54
End: 2024-10-24

## 2024-10-24 ENCOUNTER — APPOINTMENT (OUTPATIENT)
Dept: ENDOCRINOLOGY | Facility: CLINIC | Age: 54
End: 2024-10-24
Payer: COMMERCIAL

## 2024-10-24 DIAGNOSIS — M25.50 POLYARTHRALGIA: ICD-10-CM

## 2024-10-24 RX ORDER — DICLOFENAC SODIUM 10 MG/G
2 GEL TOPICAL 4 TIMES DAILY PRN
Qty: 900 G | Refills: 1 | Status: SHIPPED | OUTPATIENT
Start: 2024-10-24

## 2024-10-24 NOTE — TELEPHONE ENCOUNTER
Called Pt to cancel today's appt due to scheduling error advised Pt to call office to R/S per . Also Pt N/S appt today.   Niacinamide Pregnancy And Lactation Text: These medications are considered safe during pregnancy.

## 2024-10-25 ENCOUNTER — CLINICAL SUPPORT (OUTPATIENT)
Dept: ENDOCRINOLOGY | Facility: CLINIC | Age: 54
End: 2024-10-25
Payer: COMMERCIAL

## 2024-10-25 VITALS
BODY MASS INDEX: 26.7 KG/M2 | HEART RATE: 99 BPM | DIASTOLIC BLOOD PRESSURE: 72 MMHG | WEIGHT: 146 LBS | SYSTOLIC BLOOD PRESSURE: 108 MMHG

## 2024-10-25 DIAGNOSIS — E10.65 TYPE 1 DIABETES MELLITUS WITH HYPERGLYCEMIA (MULTI): ICD-10-CM

## 2024-10-25 DIAGNOSIS — E78.2 MIXED HYPERLIPIDEMIA: ICD-10-CM

## 2024-10-25 LAB — POC HEMOGLOBIN A1C: 9.2 % (ref 4.2–6.5)

## 2024-10-25 PROCEDURE — 83036 HEMOGLOBIN GLYCOSYLATED A1C: CPT | Performed by: INTERNAL MEDICINE

## 2024-10-25 PROCEDURE — 99214 OFFICE O/P EST MOD 30 MIN: CPT | Performed by: INTERNAL MEDICINE

## 2024-10-25 ASSESSMENT — PAIN SCALES - GENERAL: PAINLEVEL_OUTOF10: 4

## 2024-10-25 NOTE — PROGRESS NOTES
Attestation signed by Selvin Ruiz MD on 10/25/24 at 9:58 AM.    I, Dr Selvin Ruiz, have reviewed this progress note, medication list, vital signs, any pertinent lab values, and any CGM data if present with the [facility] and  face to face during this visit today. This note reflects the treatment plan that was made under my direction after reviewing the above mentioned elements while face to face with the patient and CDE.  I personally answered and addressed any questions and concerns the patient had during the visit today.  The CDE entered the data in this note under my direction and I personally reviewed it, signed any lab or medication orders that I instructed to be completed. I am the billing provider for this visit and the level of service was determined by my involvement in the Medical Decision Making Component of this visit while face to face with the patient.

## 2024-10-25 NOTE — PROGRESS NOTES
HPI   53yo presents for follow up metabolic management. Pt with Diabetes type 3c (dx 2011) (neuropathy), HTN, Dyslipidemia,depression/anxiety, chronic pancreatitis due to pancreatic divisum.   Recent poor follow up due to transportation issues. A1c-9.9% last visit, 9.2% today, grieving the recent loss of her mother.  Pt is testing sugars >4 times per day using dexcom G7.  Pt is having low sugars 1-2 times/week. Pt is following a carb controlled diet and knows reasonable carb allowances.   Pt is struggling to afford their medications, humalog vials [facility] PAP    Tandem tslim with dexcom G7, control IQ   - pump training with [employer] Aug 2022,  needed 2 replacement pumps 2023  - received replacement this past week; no data available for review                -updated time/date in pump during visit  -historically not bolusing for meals, not counting carbs or using any sort of base doses  -no sleep schedule set in new pump, can revisit future f/u   -has back up basal insulin at home in case of pump failure    Basal:           12a 0.8 u/hr            10a 0.8         ICR:           12a 1:10  ISF:           12a 50      Current Outpatient Medications:     albuterol 90 mcg/actuation inhaler, Inhale 2 puffs every 4 hours if needed for shortness of breath., Disp: 18 g, Rfl: 3    anastrozole (Arimidex) 1 mg tablet, Take 1 tablet (1 mg total) by mouth once daily., Disp: , Rfl:     blood-glucose meter (OneTouch Verio Flex meter) misc, USE AS DIRECTED, Disp: 1 each, Rfl: 0    Dexcom G6 Sensor device, CHANGE every 10 DAYS as directed, Disp: , Rfl:     Dexcom G6 Transmitter device, Inject under the skin if needed., Disp: , Rfl:     diclofenac sodium (Voltaren) 1 % gel, Apply 2.25 inches (2 g) topically 4 times a day as needed (joint pain)., Disp: 900 g, Rfl: 1    esomeprazole (NexIUM) 40 mg DR capsule, Take 1 capsule (40 mg) by mouth once daily in the morning. Take before meals., Disp: , Rfl:     fluticasone (Flonase) 50 mcg/actuation  nasal spray, Administer 2 sprays into each nostril once daily., Disp: 48 g, Rfl: 3    fluticasone propion-salmeteroL (Advair Diskus) 250-50 mcg/dose diskus inhaler, Inhale 1 puff 2 times a day., Disp: 180 each, Rfl: 1    gabapentin (Neurontin) 800 mg tablet, Take 1 tablet (800 mg) by mouth 4 times a day., Disp: 360 tablet, Rfl: 1    hydrOXYzine HCL (Atarax) 50 mg tablet, Take 1 tablet (50 mg) by mouth 4 times a day., Disp: , Rfl:     insulin lispro (HumaLOG U-100 Insulin) 100 unit/mL injection, INJECT AS DIRECTED UP TO 80 UNITS DAILY IN INSULIN PUMP, Disp: 80 mL, Rfl: 1    insulin lispro (HumaLOG) 100 unit/mL injection, INJECT AS DIRECTED UP TO 80 UNITS DAILY IN INSULIN PUMP, Disp: 80 mL, Rfl: 1    lipase-protease-amylase (Creon) 24,000-76,000 -120,000 unit capsule, 3 po ac and 2 po before snacks, Disp: 1260 capsule, Rfl: 1    lisinopril 2.5 mg tablet, Take 1 tablet (2.5 mg) by mouth once daily., Disp: 90 tablet, Rfl: 3    loratadine (Claritin) 10 mg tablet, Take 1 tablet (10 mg) by mouth once daily., Disp: 90 tablet, Rfl: 3    ondansetron (Zofran) 4 mg tablet, Take 2 tablets (8 mg) by mouth every 12 hours if needed for vomiting or nausea., Disp: 20 tablet, Rfl: 2    OneTouch Delica Plus Lancet 33 gauge misc, AS DIRECTED TO CHECK BLOOD SUGAR DAILY, Disp: 100 each, Rfl: 6    OneTouch Verio test strips strip, AS DIRECTED TO CHECK BLOOD SUGAR DAILY, Disp: 100 strip, Rfl: 1    oxyCODONE (Roxicodone) 10 mg immediate release tablet, Take 1 tablet (10 mg) by mouth every 4 hours if needed for severe pain (7 - 10)., Disp: 150 tablet, Rfl: 0    pantoprazole (ProtoNix) 40 mg EC tablet, Take 1 tablet (40 mg) by mouth once daily in the morning. Take before meals., Disp: , Rfl:     traZODone (Desyrel) 50 mg tablet, TAKE 1-3 TABLETS BY MOUTH AT BEDTIME AS NEEDED FOR ANXIETY, Disp: 270 tablet, Rfl: 1    triamterene-hydrochlorothiazid (Maxzide-25) 37.5-25 mg tablet, Take 1 tablet by mouth once daily., Disp: , Rfl:     venlafaxine XR  "(Effexor-XR) 75 mg 24 hr capsule, TAKE 1 CAPSULE (75 MG) BY MOUTH ONCE DAILY. TAKE WITH FOOD., Disp: 90 capsule, Rfl: 1    Allergies as of 10/25/2024 - Reviewed 10/25/2024   Allergen Reaction Noted    Atorvastatin Myalgia 07/19/2024    Crestor [rosuvastatin] Myalgia 07/19/2024    Morphine Hives and Unknown 05/08/2023       /72 (BP Location: Right arm, Patient Position: Sitting)   Pulse 99   Wt 66.2 kg (146 lb)   LMP  (LMP Unknown)   BMI 26.70 kg/m²     Labs:   Lab Results   Component Value Date    WBC 7.6 09/18/2023    NRBC 0.0 08/23/2020    RBC 3.97 (L) 09/18/2023    HGB 12.9 09/18/2023    HCT 43.2 09/18/2023     (L) 09/18/2023     Lab Results   Component Value Date    CALCIUM 9.4 09/18/2023     (H) 09/18/2023    ALKPHOS 626 (H) 09/18/2023    BILITOT 0.4 09/18/2023    PROT 6.4 09/18/2023    ALBUMIN 3.8 09/18/2023     (L) 09/18/2023    K 5.1 09/18/2023     09/18/2023    CO2 22 09/18/2023    ANIONGAP 17 09/18/2023    BUN 20 09/18/2023    CREATININE 0.76 09/18/2023    GLUCOSE 266 (H) 09/18/2023    ALT 93 (H) 09/18/2023     Lab Results   Component Value Date    CHOL 176 06/13/2022    TRIG 107 06/13/2022    HDL 64.2 06/13/2022     No results found for: \"MICROALBCREA\"  Lab Results   Component Value Date    TSH 2.12 06/25/2019     Lab Results   Component Value Date    SODFOAML82 601 06/13/2022     Lab Results   Component Value Date    HGBA1C 9.2 (A) 10/25/2024         Assessment/Plan   1. Type 1 diabetes mellitus with hyperglycemia (Multi)  -A1c ordered and reviewed, remains above target  -labs reviewed, needs updated labs, req in epic from PCP and our last visit (2/2024)  -elevated alk phos, appear needs to follow with liver/Gi specialist    -no cgm/pump data to review, unable to adjust pump settings  -reviewed pump settings, increase 10:00 AM to 1.0 units/hour as previously programmed  -reinforced need to enter carbs before meals; advised 20-sm/40-med/60-lg  -reinforced allowing pump " to correct bs when high  -reviewed need to treat hypoglycemia with 5-10 grams of sugar source    2. Mixed hyperlipidemia  -unable to tolerate crestor 3 times weekly after last visit      Follow up: Mauro as scheduled with Kristi Tim    -labs/tests/notes reviewed  -reviewed and counseled patient on medication monitoring and side effects    Treatment and plan discussed with Dr. Ruiz. Narayan JEFF Certified Diabetes Care and     Medical Decision Making  Complexity of problem: Chronic illness of diabetes mellitus uncontrolled, progressing  Data analyzed and reviewed: Reviewed prior notes, blood glucose data, labs including HgbA1c, lipids, serum chemistries.  Ordered tests.   Risk of complications and morbidities: Is definite because of use of insulin and risk of hypoglycemia.  Prescription medications reviewed and modifications made.  Compliance assessed.  Addressed social determinants of health including food insecurity.

## 2024-10-25 NOTE — PATIENT INSTRUCTIONS
Go for fasting labs as soon as possible    Enter carbs for meals, ideally 15-20 minutes before meals  20 carbs - small meal  40 carbs - average meal  60 carbs - large meal    Allow the pump to correct you    Treat all lows with 15 grams of sugar source   
non affiliated

## 2024-10-27 DIAGNOSIS — E10.65 TYPE 1 DIABETES MELLITUS WITH HYPERGLYCEMIA (MULTI): ICD-10-CM

## 2024-10-27 DIAGNOSIS — K21.9 GASTROESOPHAGEAL REFLUX DISEASE WITHOUT ESOPHAGITIS: ICD-10-CM

## 2024-10-28 RX ORDER — INSULIN LISPRO 100 [IU]/ML
INJECTION, SOLUTION INTRAVENOUS; SUBCUTANEOUS
Qty: 80 ML | Refills: 1 | Status: SHIPPED | OUTPATIENT
Start: 2024-10-28 | End: 2025-10-28

## 2024-10-28 RX ORDER — FAMOTIDINE 40 MG/1
40 TABLET, FILM COATED ORAL DAILY
Qty: 90 TABLET | Refills: 1 | Status: SHIPPED | OUTPATIENT
Start: 2024-10-28

## 2024-11-02 DIAGNOSIS — K86.1 CHRONIC PANCREATITIS, UNSPECIFIED PANCREATITIS TYPE (MULTI): ICD-10-CM

## 2024-11-02 DIAGNOSIS — G47.00 INSOMNIA, UNSPECIFIED: ICD-10-CM

## 2024-11-04 RX ORDER — ONDANSETRON 4 MG/1
8 TABLET, FILM COATED ORAL EVERY 12 HOURS PRN
Qty: 20 TABLET | Refills: 2 | Status: SHIPPED | OUTPATIENT
Start: 2024-11-04

## 2024-11-04 RX ORDER — TRAZODONE HYDROCHLORIDE 50 MG/1
TABLET ORAL
Qty: 270 TABLET | Refills: 1 | Status: SHIPPED | OUTPATIENT
Start: 2024-11-04

## 2024-11-08 ENCOUNTER — TELEPHONE (OUTPATIENT)
Dept: PRIMARY CARE | Facility: CLINIC | Age: 54
End: 2024-11-08
Payer: COMMERCIAL

## 2024-11-08 DIAGNOSIS — R10.9 CHRONIC ABDOMINAL PAIN: ICD-10-CM

## 2024-11-08 DIAGNOSIS — M25.50 POLYARTHRALGIA: ICD-10-CM

## 2024-11-08 DIAGNOSIS — K86.0 ALCOHOL-INDUCED CHRONIC PANCREATITIS (MULTI): ICD-10-CM

## 2024-11-08 DIAGNOSIS — G89.29 CHRONIC ABDOMINAL PAIN: ICD-10-CM

## 2024-11-08 RX ORDER — DICLOFENAC SODIUM 10 MG/G
2 GEL TOPICAL 4 TIMES DAILY PRN
Qty: 900 G | Refills: 1 | Status: SHIPPED | OUTPATIENT
Start: 2024-11-08

## 2024-11-08 RX ORDER — OXYCODONE HYDROCHLORIDE 10 MG/1
10 TABLET ORAL EVERY 4 HOURS PRN
Qty: 150 TABLET | Refills: 0 | Status: SHIPPED | OUTPATIENT
Start: 2024-11-08 | End: 2024-12-08

## 2024-11-11 ENCOUNTER — APPOINTMENT (OUTPATIENT)
Dept: PRIMARY CARE | Facility: CLINIC | Age: 54
End: 2024-11-11
Payer: COMMERCIAL

## 2024-11-26 DIAGNOSIS — J45.40 MODERATE PERSISTENT ASTHMA, UNSPECIFIED WHETHER COMPLICATED (HHS-HCC): ICD-10-CM

## 2024-11-26 RX ORDER — FLUTICASONE PROPIONATE AND SALMETEROL 250; 50 UG/1; UG/1
1 POWDER RESPIRATORY (INHALATION) 2 TIMES DAILY
Qty: 180 EACH | Refills: 1 | Status: SHIPPED | OUTPATIENT
Start: 2024-11-26

## 2024-12-13 DIAGNOSIS — R10.9 CHRONIC ABDOMINAL PAIN: ICD-10-CM

## 2024-12-13 DIAGNOSIS — K86.0 ALCOHOL-INDUCED CHRONIC PANCREATITIS (MULTI): ICD-10-CM

## 2024-12-13 DIAGNOSIS — G89.29 CHRONIC ABDOMINAL PAIN: ICD-10-CM

## 2024-12-13 RX ORDER — OXYCODONE HYDROCHLORIDE 10 MG/1
10 TABLET ORAL EVERY 4 HOURS PRN
Qty: 150 TABLET | Refills: 0 | Status: SHIPPED | OUTPATIENT
Start: 2024-12-13 | End: 2025-01-12

## 2024-12-21 DIAGNOSIS — E10.29: ICD-10-CM

## 2024-12-21 DIAGNOSIS — J30.1 NON-SEASONAL ALLERGIC RHINITIS DUE TO POLLEN: ICD-10-CM

## 2024-12-21 DIAGNOSIS — R80.9: ICD-10-CM

## 2024-12-21 DIAGNOSIS — F33.1 MDD (MAJOR DEPRESSIVE DISORDER), RECURRENT EPISODE, MODERATE: ICD-10-CM

## 2024-12-23 RX ORDER — VENLAFAXINE HYDROCHLORIDE 75 MG/1
75 CAPSULE, EXTENDED RELEASE ORAL DAILY
Qty: 90 CAPSULE | Refills: 1 | Status: SHIPPED | OUTPATIENT
Start: 2024-12-23 | End: 2025-06-21

## 2024-12-23 RX ORDER — LISINOPRIL 2.5 MG/1
2.5 TABLET ORAL DAILY
Qty: 90 TABLET | Refills: 3 | Status: SHIPPED | OUTPATIENT
Start: 2024-12-23

## 2024-12-23 RX ORDER — FLUTICASONE PROPIONATE 50 MCG
2 SPRAY, SUSPENSION (ML) NASAL DAILY
Qty: 48 ML | Refills: 3 | Status: SHIPPED | OUTPATIENT
Start: 2024-12-23 | End: 2025-12-23

## 2025-01-16 ENCOUNTER — TELEMEDICINE (OUTPATIENT)
Dept: PRIMARY CARE | Facility: CLINIC | Age: 55
End: 2025-01-16
Payer: COMMERCIAL

## 2025-01-16 ENCOUNTER — TELEPHONE (OUTPATIENT)
Dept: ENDOCRINOLOGY | Facility: CLINIC | Age: 55
End: 2025-01-16

## 2025-01-16 ENCOUNTER — APPOINTMENT (OUTPATIENT)
Dept: ENDOCRINOLOGY | Facility: CLINIC | Age: 55
End: 2025-01-16
Payer: COMMERCIAL

## 2025-01-16 DIAGNOSIS — K86.0 ALCOHOL-INDUCED CHRONIC PANCREATITIS (MULTI): ICD-10-CM

## 2025-01-16 DIAGNOSIS — R10.9 CHRONIC ABDOMINAL PAIN: ICD-10-CM

## 2025-01-16 DIAGNOSIS — H66.002 NON-RECURRENT ACUTE SUPPURATIVE OTITIS MEDIA OF LEFT EAR WITHOUT SPONTANEOUS RUPTURE OF TYMPANIC MEMBRANE: Primary | ICD-10-CM

## 2025-01-16 DIAGNOSIS — G89.29 CHRONIC ABDOMINAL PAIN: ICD-10-CM

## 2025-01-16 DIAGNOSIS — E11.40 DIABETIC NEUROPATHY, PAINFUL (MULTI): ICD-10-CM

## 2025-01-16 PROCEDURE — 4010F ACE/ARB THERAPY RXD/TAKEN: CPT | Performed by: FAMILY MEDICINE

## 2025-01-16 PROCEDURE — 99213 OFFICE O/P EST LOW 20 MIN: CPT | Performed by: FAMILY MEDICINE

## 2025-01-16 RX ORDER — GABAPENTIN 800 MG/1
800 TABLET ORAL 4 TIMES DAILY
Qty: 360 TABLET | Refills: 1 | Status: SHIPPED | OUTPATIENT
Start: 2025-01-16 | End: 2025-07-15

## 2025-01-16 RX ORDER — AMOXICILLIN 875 MG/1
875 TABLET, FILM COATED ORAL 2 TIMES DAILY
Qty: 20 TABLET | Refills: 0 | Status: SHIPPED | OUTPATIENT
Start: 2025-01-16 | End: 2025-01-26

## 2025-01-16 RX ORDER — OXYCODONE HYDROCHLORIDE 10 MG/1
10 TABLET ORAL EVERY 4 HOURS PRN
Qty: 150 TABLET | Refills: 0 | Status: SHIPPED | OUTPATIENT
Start: 2025-01-16 | End: 2025-02-15

## 2025-01-16 ASSESSMENT — PATIENT HEALTH QUESTIONNAIRE - PHQ9
1. LITTLE INTEREST OR PLEASURE IN DOING THINGS: NOT AT ALL
2. FEELING DOWN, DEPRESSED OR HOPELESS: NOT AT ALL
SUM OF ALL RESPONSES TO PHQ9 QUESTIONS 1 AND 2: 0

## 2025-01-16 NOTE — PROGRESS NOTES
Subjective   Patient ID: Laxmi Sousa is a 54 y.o. female who presents for Sinusitis.  HPI  Has been having left ear pain for 2 weeks  No loss of hearing  Occasional dizziness  No fever, chills, n/v, diarrhea, abdominal pain, rashes, HA  No St  + runny/stuffy nose    Current Outpatient Medications:     albuterol 90 mcg/actuation inhaler, Inhale 2 puffs every 4 hours if needed for shortness of breath., Disp: 18 g, Rfl: 3    amoxicillin (Amoxil) 875 mg tablet, Take 1 tablet (875 mg) by mouth 2 times a day for 10 days., Disp: 20 tablet, Rfl: 0    anastrozole (Arimidex) 1 mg tablet, Take 1 tablet (1 mg total) by mouth once daily., Disp: , Rfl:     blood-glucose meter (OneTouch Verio Flex meter) misc, USE AS DIRECTED, Disp: 1 each, Rfl: 0    Dexcom G6 Sensor device, CHANGE every 10 DAYS as directed, Disp: , Rfl:     Dexcom G6 Transmitter device, Inject under the skin if needed., Disp: , Rfl:     diclofenac sodium (Voltaren) 1 % gel, Apply 2.25 inches (2 g) topically 4 times a day as needed (joint pain)., Disp: 900 g, Rfl: 1    esomeprazole (NexIUM) 40 mg DR capsule, Take 1 capsule (40 mg) by mouth once daily in the morning. Take before meals., Disp: , Rfl:     famotidine (Pepcid) 40 mg tablet, TAKE 1 TABLET BY MOUTH EVERY DAY, Disp: 90 tablet, Rfl: 1    fluticasone (Flonase) 50 mcg/actuation nasal spray, ADMINISTER 2 SPRAYS INTO EACH NOSTRIL ONCE DAILY., Disp: 48 mL, Rfl: 3    fluticasone propion-salmeteroL (Advair Diskus) 250-50 mcg/dose diskus inhaler, Inhale 1 puff 2 times a day., Disp: 180 each, Rfl: 1    gabapentin (Neurontin) 800 mg tablet, Take 1 tablet (800 mg) by mouth 4 times a day., Disp: 360 tablet, Rfl: 1    hydrOXYzine HCL (Atarax) 50 mg tablet, Take 1 tablet (50 mg) by mouth 4 times a day., Disp: , Rfl:     insulin lispro (HumaLOG U-100 Insulin) 100 unit/mL injection, INJECT AS DIRECTED UP TO 80 UNITS DAILY IN INSULIN PUMP, Disp: 80 mL, Rfl: 1    insulin lispro 100 unit/mL injection, INJECT AS  DIRECTED UP TO 80 UNITS DAILY IN INSULIN PUMP, Disp: 80 mL, Rfl: 1    lipase-protease-amylase (Creon) 24,000-76,000 -120,000 unit capsule, 3 po ac and 2 po before snacks, Disp: 1260 capsule, Rfl: 1    lisinopril 2.5 mg tablet, TAKE 1 TABLET BY MOUTH ONCE DAILY., Disp: 90 tablet, Rfl: 3    loratadine (Claritin) 10 mg tablet, Take 1 tablet (10 mg) by mouth once daily., Disp: 90 tablet, Rfl: 3    ondansetron (Zofran) 4 mg tablet, TAKE 2 TABLETS (8 MG) BY MOUTH EVERY 12 HOURS IF NEEDED FOR VOMITING OR NAUSEA., Disp: 20 tablet, Rfl: 2    OneTouch Delica Plus Lancet 33 gauge misc, AS DIRECTED TO CHECK BLOOD SUGAR DAILY, Disp: 100 each, Rfl: 6    OneTouch Verio test strips strip, AS DIRECTED TO CHECK BLOOD SUGAR DAILY, Disp: 100 strip, Rfl: 1    oxyCODONE (Roxicodone) 10 mg immediate release tablet, Take 1 tablet (10 mg) by mouth every 4 hours if needed for severe pain (7 - 10)., Disp: 150 tablet, Rfl: 0    pantoprazole (ProtoNix) 40 mg EC tablet, Take 1 tablet (40 mg) by mouth once daily in the morning. Take before meals., Disp: , Rfl:     traZODone (Desyrel) 50 mg tablet, TAKE 1-3 TABLETS BY MOUTH AT BEDTIME AS NEEDED FOR ANXIETY, Disp: 270 tablet, Rfl: 1    triamterene-hydrochlorothiazid (Maxzide-25) 37.5-25 mg tablet, Take 1 tablet by mouth once daily., Disp: , Rfl:     venlafaxine XR (Effexor-XR) 75 mg 24 hr capsule, TAKE 1 CAPSULE (75 MG) BY MOUTH ONCE DAILY. TAKE WITH FOOD., Disp: 90 capsule, Rfl: 1   Past Surgical History:   Procedure Laterality Date    BREAST LUMPECTOMY Right     br ca radiation, no chemo ,taking astrozil    BREAST LUMPECTOMY W/ NEEDLE LOCALIZATION Right 2022    [place] lymph node excisional biopsy.    CHOLECYSTECTOMY  2013    Cholecystectomy Laparoscopic    CYSTOSCOPY      URETHRAL DILATION    LITHOTRIPSY  2013    Renal Lithotripsy    OTHER SURGICAL HISTORY  2013    Endoscopic Retrograde Cholangiopancreatography (ERCP)    OTHER SURGICAL HISTORY  2019      section    OTHER SURGICAL HISTORY  04/02/2014    ERCP With Change Of Tube/Stent    OTHER SURGICAL HISTORY  06/29/2015    Incision And Drainage Of Skin Abscess Buttocks    TONSILLECTOMY  08/23/2018    Tonsillectomy    VENTRAL HERNIA REPAIR  03/28/2018    Ventral Hernia Repair      Past Medical History:   Diagnosis Date    Acute actinic otitis externa, left ear 08/17/2020    Acute actinic otitis externa of left ear    Acute candidiasis of vulva and vagina 04/06/2016    Vaginitis due to Candida albicans    Acute mastoiditis without complications, left ear 08/21/2020    Acute mastoiditis of left side    Acute serous otitis media, left ear 08/17/2020    Non-recurrent acute serous otitis media of left ear    Acute suppurative otitis media without spontaneous rupture of ear drum, left ear 08/17/2020    Non-recurrent acute suppurative otitis media of left ear without spontaneous rupture of tympanic membrane    Anxiety and depression     Asthma     Breast cancer (Multi)     Chronic pancreatitis (Multi)     Cutaneous abscess of buttock 04/08/2015    Abscess of buttock, right    Diabetic neuropathy (Multi)     Diverticular disease of colon     DM type 1 (diabetes mellitus, type 1) (Multi)     Encounter for debridement of left postmastoidectomy cavity 04/04/2023    Enlarged lymph node 04/04/2023    Fatigue 04/04/2023    GERD (gastroesophageal reflux disease)     Hidradenitis suppurativa     History of lumpectomy of right breast 07/20/2022    History of right breast cancer     Ductal carcinoma in situ (DCIS)    LFT elevation     Other chronic nonsuppurative otitis media, left ear 05/03/2021    Other chronic nonsuppurative otitis media of left ear    Other chronic suppurative otitis media, left ear 11/06/2020    Chronic suppurative otitis media of left ear, unspecified otitis media location    Other obesity due to excess calories 03/31/2020    Class 1 obesity due to excess calories with serious comorbidity and body mass index  (BMI) of 30.0 to 30.9 in adult    Personal history of irradiation     Personal history of other diseases of the digestive system     History of chronic pancreatitis    Personal history of other diseases of the respiratory system 02/24/2021    History of acute bronchitis    Personal history of other diseases of the respiratory system 01/23/2017    History of acute sinusitis    Personal history of other mental and behavioral disorders 07/07/2017    History of depression    Personal history of other specified conditions 08/28/2019    History of painful urination    Stress-induced cardiomyopathy 04/04/2023    Swimmer's ear, left ear 04/28/2020    Acute swimmer's ear of left side    Type 1 diabetes mellitus with hyperglycemia (Multi)     Urethral stricture     Viral conjunctivitis, unspecified 07/16/2021    Acute viral conjunctivitis, unspecified laterality    Weak urinary stream 04/04/2023     Social History     Tobacco Use    Smoking status: Every Day     Current packs/day: 0.50     Types: Cigarettes    Smokeless tobacco: Never   Vaping Use    Vaping status: Never Used   Substance Use Topics    Alcohol use: Never    Drug use: Never      Family History   Problem Relation Name Age of Onset    Breast cancer Mother  65      Review of Systems    Objective   LMP  (LMP Unknown)    Physical Exam    Assessment/Plan   Problem List Items Addressed This Visit    None  Visit Diagnoses       Non-recurrent acute suppurative otitis media of left ear without spontaneous rupture of tympanic membrane    -  Primary    Relevant Medications    amoxicillin (Amoxil) 875 mg tablet        Fluids, rest, OTC cold meds    Told parent/patient that if no improvement in 2-3 days then please call. If worsens or new symptoms occur then please call when this occurs. If worsening then go to ER immediately    I discussed with the patient the potential benefits and risks of the use of telephone or video-conferencing that differ from in-person services  "(e.g., limits to patient confidentiality, limitations on the provider’s ability to observe the patient, limitations on the diagnostic tools available). I explained to the patient that I may determine at any point that telehealth services are not appropriate based on the patient’s circumstances and either party may therefore end the service to schedule an alternative in-person service or contact 911 to address a medical emergency. With the understanding of these risks, benefits and alternatives, the patient agreed to use the telephone or video-conferencing platform selected for this virtual session and further the patient confirmed his/her understanding that the services do not guarantee a specific outcome or recovery.  \"Spent 5 minutes with patient on phone discussing health concerns.\"      Patient understands and agrees with treatment plan    Neil Louie, DO   "

## 2025-01-17 ASSESSMENT — PATIENT HEALTH QUESTIONNAIRE - PHQ9
1. LITTLE INTEREST OR PLEASURE IN DOING THINGS: NOT AT ALL
SUM OF ALL RESPONSES TO PHQ9 QUESTIONS 1 AND 2: 0
2. FEELING DOWN, DEPRESSED OR HOPELESS: NOT AT ALL

## 2025-01-22 ENCOUNTER — OFFICE VISIT (OUTPATIENT)
Dept: HEMATOLOGY/ONCOLOGY | Facility: CLINIC | Age: 55
End: 2025-01-22
Payer: COMMERCIAL

## 2025-01-22 VITALS
SYSTOLIC BLOOD PRESSURE: 135 MMHG | WEIGHT: 148.7 LBS | BODY MASS INDEX: 26.35 KG/M2 | HEIGHT: 63 IN | TEMPERATURE: 96.8 F | HEART RATE: 100 BPM | RESPIRATION RATE: 17 BRPM | DIASTOLIC BLOOD PRESSURE: 84 MMHG | OXYGEN SATURATION: 97 %

## 2025-01-22 DIAGNOSIS — C50.411 MALIGNANT NEOPLASM OF UPPER-OUTER QUADRANT OF RIGHT BREAST IN FEMALE, ESTROGEN RECEPTOR POSITIVE: Primary | ICD-10-CM

## 2025-01-22 DIAGNOSIS — Z17.0 MALIGNANT NEOPLASM OF UPPER-OUTER QUADRANT OF RIGHT BREAST IN FEMALE, ESTROGEN RECEPTOR POSITIVE: Primary | ICD-10-CM

## 2025-01-22 LAB
ALBUMIN SERPL BCP-MCNC: 3.6 G/DL (ref 3.4–5)
ALP SERPL-CCNC: 426 U/L (ref 33–110)
ALT SERPL W P-5'-P-CCNC: 96 U/L (ref 7–45)
ANION GAP SERPL CALC-SCNC: 11 MMOL/L (ref 10–20)
AST SERPL W P-5'-P-CCNC: 55 U/L (ref 9–39)
BASOPHILS # BLD AUTO: 0.04 X10*3/UL (ref 0–0.1)
BASOPHILS NFR BLD AUTO: 0.7 %
BILIRUB SERPL-MCNC: 0.3 MG/DL (ref 0–1.2)
BUN SERPL-MCNC: 20 MG/DL (ref 6–23)
CALCIUM SERPL-MCNC: 9.4 MG/DL (ref 8.6–10.3)
CHLORIDE SERPL-SCNC: 100 MMOL/L (ref 98–107)
CO2 SERPL-SCNC: 30 MMOL/L (ref 21–32)
CREAT SERPL-MCNC: 0.76 MG/DL (ref 0.5–1.05)
EGFRCR SERPLBLD CKD-EPI 2021: >90 ML/MIN/1.73M*2
EOSINOPHIL # BLD AUTO: 0.14 X10*3/UL (ref 0–0.7)
EOSINOPHIL NFR BLD AUTO: 2.4 %
ERYTHROCYTE [DISTWIDTH] IN BLOOD BY AUTOMATED COUNT: 13.8 % (ref 11.5–14.5)
GLUCOSE SERPL-MCNC: 165 MG/DL (ref 74–99)
HCT VFR BLD AUTO: 38.9 % (ref 36–46)
HGB BLD-MCNC: 12.3 G/DL (ref 12–16)
IMM GRANULOCYTES # BLD AUTO: 0.01 X10*3/UL (ref 0–0.7)
IMM GRANULOCYTES NFR BLD AUTO: 0.2 % (ref 0–0.9)
LYMPHOCYTES # BLD AUTO: 1.3 X10*3/UL (ref 1.2–4.8)
LYMPHOCYTES NFR BLD AUTO: 22.6 %
MCH RBC QN AUTO: 31.3 PG (ref 26–34)
MCHC RBC AUTO-ENTMCNC: 31.6 G/DL (ref 32–36)
MCV RBC AUTO: 99 FL (ref 80–100)
MONOCYTES # BLD AUTO: 0.47 X10*3/UL (ref 0.1–1)
MONOCYTES NFR BLD AUTO: 8.2 %
NEUTROPHILS # BLD AUTO: 3.79 X10*3/UL (ref 1.2–7.7)
NEUTROPHILS NFR BLD AUTO: 65.9 %
PLATELET # BLD AUTO: 153 X10*3/UL (ref 150–450)
POTASSIUM SERPL-SCNC: 4.8 MMOL/L (ref 3.5–5.3)
PROT SERPL-MCNC: 6.6 G/DL (ref 6.4–8.2)
RBC # BLD AUTO: 3.93 X10*6/UL (ref 4–5.2)
SODIUM SERPL-SCNC: 136 MMOL/L (ref 136–145)
WBC # BLD AUTO: 5.8 X10*3/UL (ref 4.4–11.3)

## 2025-01-22 PROCEDURE — 99215 OFFICE O/P EST HI 40 MIN: CPT | Performed by: NURSE PRACTITIONER

## 2025-01-22 PROCEDURE — 86300 IMMUNOASSAY TUMOR CA 15-3: CPT | Mod: GEALAB | Performed by: NURSE PRACTITIONER

## 2025-01-22 PROCEDURE — 4010F ACE/ARB THERAPY RXD/TAKEN: CPT | Performed by: NURSE PRACTITIONER

## 2025-01-22 PROCEDURE — 3008F BODY MASS INDEX DOCD: CPT | Performed by: NURSE PRACTITIONER

## 2025-01-22 PROCEDURE — 84075 ASSAY ALKALINE PHOSPHATASE: CPT | Performed by: NURSE PRACTITIONER

## 2025-01-22 PROCEDURE — 3075F SYST BP GE 130 - 139MM HG: CPT | Performed by: NURSE PRACTITIONER

## 2025-01-22 PROCEDURE — 36415 COLL VENOUS BLD VENIPUNCTURE: CPT | Performed by: NURSE PRACTITIONER

## 2025-01-22 PROCEDURE — 85025 COMPLETE CBC W/AUTO DIFF WBC: CPT | Performed by: NURSE PRACTITIONER

## 2025-01-22 PROCEDURE — 3079F DIAST BP 80-89 MM HG: CPT | Performed by: NURSE PRACTITIONER

## 2025-01-22 RX ORDER — ANASTROZOLE 1 MG/1
1 TABLET ORAL DAILY
Qty: 90 TABLET | Refills: 3 | Status: SHIPPED | OUTPATIENT
Start: 2025-01-22 | End: 2026-01-22

## 2025-01-22 SDOH — ECONOMIC STABILITY: FOOD INSECURITY: WITHIN THE PAST 12 MONTHS, THE FOOD YOU BOUGHT JUST DIDN'T LAST AND YOU DIDN'T HAVE MONEY TO GET MORE.: NEVER TRUE

## 2025-01-22 SDOH — ECONOMIC STABILITY: FOOD INSECURITY: WITHIN THE PAST 12 MONTHS, YOU WORRIED THAT YOUR FOOD WOULD RUN OUT BEFORE YOU GOT THE MONEY TO BUY MORE.: NEVER TRUE

## 2025-01-22 ASSESSMENT — COLUMBIA-SUICIDE SEVERITY RATING SCALE - C-SSRS
1. IN THE PAST MONTH, HAVE YOU WISHED YOU WERE DEAD OR WISHED YOU COULD GO TO SLEEP AND NOT WAKE UP?: NO
6. HAVE YOU EVER DONE ANYTHING, STARTED TO DO ANYTHING, OR PREPARED TO DO ANYTHING TO END YOUR LIFE?: NO
2. HAVE YOU ACTUALLY HAD ANY THOUGHTS OF KILLING YOURSELF?: NO

## 2025-01-22 ASSESSMENT — PAIN SCALES - GENERAL: PAINLEVEL_OUTOF10: 0-NO PAIN

## 2025-01-22 ASSESSMENT — PATIENT HEALTH QUESTIONNAIRE - PHQ9
SUM OF ALL RESPONSES TO PHQ9 QUESTIONS 1 AND 2: 0
1. LITTLE INTEREST OR PLEASURE IN DOING THINGS: NOT AT ALL
2. FEELING DOWN, DEPRESSED OR HOPELESS: NOT AT ALL

## 2025-01-22 NOTE — PROGRESS NOTES
Patient ID: Laxmi Sousa is a 54 y.o. female.    Subjective    HPI  Cancer History:          Breast         AJCC Edition: 8th (AJCC), Diagnosis Date: Jul 2022, IA, pT1c pN0 cM0 G2       Interval History:    Treatment Synopsis:    - Screening mammogram on 6/22/22 showed a mass in the right breast at 10:00.  - Biopsy of the mass showed grade 2 IDC, ER >95% WA 90% Her2-negative. Mammaprint assay low-risk (+0.172) luminal A type.  - 7/20/22: Lumpectomy/SLNBx performed. 1.2 cm of grade 2 IDC noted. Margins negative, no LVI. 0/2 SLN'c involved.  - Adjuvant RT completed 10/26/22.  -Anastrazole initiated Nov 2022 plan for 5 years.             Ms Sousa is 55yo who presents for evaluation and survillance of the above breast cancer.  She initiated Anastrazole Nov 2022 and has had minimal issues with  hot flashes until recently she began having night sweats.  She has bone and joint pain and is being evaluated for lupus. She is on Effexor and has had no further issues with hot flashes, night sweats or bone pain.   She was previously hospitalized for pancreatitis.  Mammogram 9/10/24 followed by right axillary lymph node ultrasound 9/23/24 determined to be benign.  DEXA 11/10/22 with osteopenia  She states she has rotator cuff issues.  Labs today are pending.    FH is significant for mother with breast cancer 66yo, mothers sister with pancreatic cancer  76yo and two  maternal second cousins with breast cancer          Objective    BSA: There is no height or weight on file to calculate BSA.  LMP  (LMP Unknown)      Physical Exam  Constitutional:       Appearance: Normal appearance.   Eyes:      Conjunctiva/sclera: Conjunctivae normal.      Pupils: Pupils are equal, round, and reactive to light.   Cardiovascular:      Rate and Rhythm: Normal rate and regular rhythm.      Pulses: Normal pulses.      Heart sounds: Normal heart sounds. No murmur heard.  Pulmonary:      Effort: Pulmonary effort is normal. No respiratory distress.       Breath sounds: Normal breath sounds. No stridor. No wheezing or rhonchi.   Abdominal:      General: There is no distension.      Palpations: Abdomen is soft. There is no mass.      Tenderness: There is no abdominal tenderness.   Musculoskeletal:         General: No swelling or deformity. Normal range of motion.   Lymphadenopathy:      Cervical: No cervical adenopathy.   Skin:     Coloration: Skin is not jaundiced or pale.      Findings: No bruising.   Neurological:      Motor: No weakness.   Psychiatric:      Comments: Right breast with surgical defect.  No areas of induration or palpable abnormality.  Left breast benign.      Performance Status:  Asymptomatic      Assessment/Plan     Right breast cancer.  - S/p lumpectomy/SLNBx 7/20/22 and adjuvant RTcompeleted 10/26/22  - Anastrazole intiated Nov 2022. I recommend 5 years of aromatase inhibitor therapy in order to reduce her changes of recurrence and improve survival. Potential toxicities discussed, and she is agreeable to proceed.  - Script for anastrozole 1 mg daily sent to pharmacy.   She is taking Effexor 75mg every day with resolution of hot flashes, night sweats and joint pain.  PCP prescribes it for depression     - Baseline DEXA 11/10/22 showed osteopenia  - Recommended Ca/VitD supplementation.  Mammogram due Sept 2025  OV in 6 months with provider       Cancer Staging   Malignant neoplasm of upper-outer quadrant of right breast in female, estrogen receptor positive  Staging form: Breast, AJCC 8th Edition  - Clinical stage from 7/22/2022: cT1c, cN0, cM0, ER+, OK+, HER2- - Unsigned           Diagnoses and all orders for this visit:  Malignant neoplasm of upper-outer quadrant of right breast in female, estrogen receptor positive  -     anastrozole (Arimidex) 1 mg tablet; Take 1 tablet (1 mg total) by mouth once daily.  -     CBC and Auto Differential; Future  -     Comprehensive Metabolic Panel; Future  -     Clinic Appointment Request Follow up;  Future           Kelly Gurerier PA-C

## 2025-01-22 NOTE — PROGRESS NOTES
"Patient ID: Laxmi Sousa is a 54 y.o. female.    Subjective    HPI  Cancer History:          Breast         AJCC Edition: 8th (AJCC), Diagnosis Date: Jul 2022, IA, pT1c pN0 cM0 G2     Cancer History:          Breast         AJCC Edition: 8th (AJCC), Diagnosis Date: Jul 2022, IA, pT1c pN0 cM0 G2     Interval History:    Treatment Synopsis:    - Screening mammogram on 6/22/22 showed a mass in the right breast at 10:00.  - Biopsy of the mass showed grade 2 IDC, ER >95% CT 90% Her2-negative. Mammaprint assay low-risk (+0.172) luminal A type.  - 7/20/22: Lumpectomy/SLNBx performed. 1.2 cm of grade 2 IDC noted. Margins negative, no LVI. 0/2 SLN'c involved.  - Adjuvant RT completed 10/26/22.  -Anastrazole initiated Nov 2022 plan for 5 years.             Ms Sousa is 53yo who presents for evaluation and survillance of the above breast cancer.  She initiated Anastrazole Nov 2022 and has had minimal issues with  hot flashes until recently she began having night sweats.  She has bone and joint pain and is being evaluated for lupus.  She has been hospitalized for pancreatitis.  Mammogram 910/24 with right axillary ultrasoun9/23/24 determined to be benign.  DEXA 11/10/22 with osteopenia   She is tolerating anastrazole better with use of Effexor and has no complaints of hot flashes, night sweats or joint pain  FH is significant for mother with breast cancer 66yo, mothers sister with pancreatic cancer  76yo and two  maternal second cousins with breast cancer    Objective    BSA: 1.73 meters squared  /84 (BP Location: Right arm, Patient Position: Sitting, BP Cuff Size: Large adult)   Pulse 100   Temp 36 °C (96.8 °F) (Temporal)   Resp 17   Ht (S) 1.598 m (5' 2.91\")   Wt 67.4 kg (148 lb 11.2 oz)   LMP  (LMP Unknown)   SpO2 97%   BMI 26.41 kg/m²      Physical Exam  Normal exam, no suspicous findings    Performance Status:  Asymptomatic      Assessment/Plan   Right breast cancer.  - S/p lumpectomy/SLNBx 7/20/22 and " adjuvant RTcompeleted 10/26/22  - Anastrazole intiated Nov 2022. I recommend 5 years of aromatase inhibitor therapy in order to reduce her changes of recurrence and improve survival. Potential toxicities discussed, and she is agreeable to proceed.  - Script for anastrozole 1 mg daily sent to pharmacy.   Will try Effexor for hot flashes.  Start at 37.5mg QD and will increase if needed.      - Baseline DEXA 11/10/22 showed osteopenia  - Recommended Ca/VitD supplementation.             Cancer Staging   Malignant neoplasm of upper-outer quadrant of right breast in female, estrogen receptor positive  Staging form: Breast, AJCC 8th Edition  - Clinical stage from 7/22/2022: cT1c, cN0, cM0, ER+, ME+, HER2- - Unsigned           Diagnoses and all orders for this visit:  Malignant neoplasm of upper-outer quadrant of right breast in female, estrogen receptor positive  -     CBC and Auto Differential; Future  -     Comprehensive Metabolic Panel; Future  -     Cancer Antigen 27-29; Future           Kelly Guerrier PA-C

## 2025-01-23 LAB — CANCER AG27-29 SERPL-ACNC: 50.8 U/ML (ref 0–38.6)

## 2025-01-24 ENCOUNTER — APPOINTMENT (OUTPATIENT)
Dept: OTOLARYNGOLOGY | Facility: CLINIC | Age: 55
End: 2025-01-24
Payer: COMMERCIAL

## 2025-01-24 VITALS — BODY MASS INDEX: 27.11 KG/M2 | WEIGHT: 153 LBS | HEIGHT: 63 IN

## 2025-01-24 DIAGNOSIS — K86.1 IDIOPATHIC CHRONIC PANCREATITIS (MULTI): ICD-10-CM

## 2025-01-24 DIAGNOSIS — C50.411 MALIGNANT NEOPLASM OF UPPER-OUTER QUADRANT OF RIGHT BREAST IN FEMALE, ESTROGEN RECEPTOR POSITIVE: ICD-10-CM

## 2025-01-24 DIAGNOSIS — H90.72 MIXED CONDUCTIVE AND SENSORINEURAL HEARING LOSS OF LEFT EAR WITH UNRESTRICTED HEARING OF RIGHT EAR: ICD-10-CM

## 2025-01-24 DIAGNOSIS — Z17.0 MALIGNANT NEOPLASM OF UPPER-OUTER QUADRANT OF RIGHT BREAST IN FEMALE, ESTROGEN RECEPTOR POSITIVE: ICD-10-CM

## 2025-01-24 DIAGNOSIS — E10.65 UNCONTROLLED TYPE 1 DIABETES MELLITUS WITH HYPERGLYCEMIA: ICD-10-CM

## 2025-01-24 DIAGNOSIS — H66.92 OTITIS MEDIA, CHRONIC, LEFT: Primary | ICD-10-CM

## 2025-01-24 DIAGNOSIS — E10.65 TYPE 1 DIABETES MELLITUS WITH HYPERGLYCEMIA (MULTI): Primary | ICD-10-CM

## 2025-01-24 DIAGNOSIS — H92.10 OTORRHEA, UNSPECIFIED LATERALITY: ICD-10-CM

## 2025-01-24 PROCEDURE — 99204 OFFICE O/P NEW MOD 45 MIN: CPT | Performed by: OTOLARYNGOLOGY

## 2025-01-24 PROCEDURE — 3008F BODY MASS INDEX DOCD: CPT | Performed by: OTOLARYNGOLOGY

## 2025-01-24 PROCEDURE — 4010F ACE/ARB THERAPY RXD/TAKEN: CPT | Performed by: OTOLARYNGOLOGY

## 2025-01-24 RX ORDER — BLOOD-GLUCOSE SENSOR
EACH MISCELLANEOUS
Qty: 9 EACH | Refills: 3 | Status: SHIPPED | OUTPATIENT
Start: 2025-01-24

## 2025-01-24 RX ORDER — CIPROFLOXACIN AND DEXAMETHASONE 3; 1 MG/ML; MG/ML
SUSPENSION/ DROPS AURICULAR (OTIC)
Qty: 7.5 ML | Refills: 2 | Status: SHIPPED | OUTPATIENT
Start: 2025-01-24 | End: 2025-01-29

## 2025-01-24 ASSESSMENT — ENCOUNTER SYMPTOMS
SORE THROAT: 0
VOMITING: 0
COUGH: 0
RHINORRHEA: 1
HEADACHES: 0
DIARRHEA: 0
ABDOMINAL PAIN: 0

## 2025-01-24 NOTE — PROGRESS NOTES
Chief Complaint   Patient presents with    New Patient Visit     NP- EAR INFECTION, MASTOID CLEANING      Date of Evaluation: 1/24/2025   HPI  Laxmi Sousa is a 54 y.o. female diabetic patient with a history of malignant otitis externa in August 2020 that necessitated emergency left canal wall down mastoidectomy by Dr. Wilson.  She has not had any follow-up visits.  She complains of difficulty hearing from the left ear.  She has intermittent pain.  Reviewing the operative note from August 21, 2020 she had left malignant otitis externa with coalescing mastoiditis and Bezold's abscess.  She had a complex modified radical mastoidectomy tympanoplasty and meatoplasty.  She had erosion of the posterior external auditory canal with direct communication with the mastoid cavity and mastoid air cells.  She had erosion of the inferior external auditory canal with cholesteatoma extending into the mastoid.  She had a completely avascular and eroded bone in the posterior external auditory canal and mastoid tip.  Tegmen tympani intact.  Facial nerve not dehiscent.  Ossicular chain intact.  Middle ear was not involved.       Past Medical History:   Diagnosis Date    Acute actinic otitis externa, left ear 08/17/2020    Acute actinic otitis externa of left ear    Acute candidiasis of vulva and vagina 04/06/2016    Vaginitis due to Candida albicans    Acute mastoiditis without complications, left ear 08/21/2020    Acute mastoiditis of left side    Acute serous otitis media, left ear 08/17/2020    Non-recurrent acute serous otitis media of left ear    Acute suppurative otitis media without spontaneous rupture of ear drum, left ear 08/17/2020    Non-recurrent acute suppurative otitis media of left ear without spontaneous rupture of tympanic membrane    Anxiety and depression     Asthma     Breast cancer (Multi)     Chronic pancreatitis (Multi)     Cutaneous abscess of buttock 04/08/2015    Abscess of buttock, right    Diabetic  neuropathy (Multi)     Diverticular disease of colon     DM type 1 (diabetes mellitus, type 1) (Multi)     Encounter for debridement of left postmastoidectomy cavity 04/04/2023    Enlarged lymph node 04/04/2023    Fatigue 04/04/2023    GERD (gastroesophageal reflux disease)     Hidradenitis suppurativa     History of lumpectomy of right breast 07/20/2022    History of right breast cancer     Ductal carcinoma in situ (DCIS)    LFT elevation     Other chronic nonsuppurative otitis media, left ear 05/03/2021    Other chronic nonsuppurative otitis media of left ear    Other chronic suppurative otitis media, left ear 11/06/2020    Chronic suppurative otitis media of left ear, unspecified otitis media location    Other obesity due to excess calories 03/31/2020    Class 1 obesity due to excess calories with serious comorbidity and body mass index (BMI) of 30.0 to 30.9 in adult    Personal history of irradiation     Personal history of other diseases of the digestive system     History of chronic pancreatitis    Personal history of other diseases of the respiratory system 02/24/2021    History of acute bronchitis    Personal history of other diseases of the respiratory system 01/23/2017    History of acute sinusitis    Personal history of other mental and behavioral disorders 07/07/2017    History of depression    Personal history of other specified conditions 08/28/2019    History of painful urination    Stress-induced cardiomyopathy 04/04/2023    Swimmer's ear, left ear 04/28/2020    Acute swimmer's ear of left side    Type 1 diabetes mellitus with hyperglycemia (Multi)     Urethral stricture     Viral conjunctivitis, unspecified 07/16/2021    Acute viral conjunctivitis, unspecified laterality    Weak urinary stream 04/04/2023      Past Surgical History:   Procedure Laterality Date    BREAST LUMPECTOMY Right 2022    br ca radiation, no chemo ,taking astrozil    BREAST LUMPECTOMY W/ NEEDLE LOCALIZATION Right 07/20/2022     [place] lymph node excisional biopsy.    CHOLECYSTECTOMY  2013    Cholecystectomy Laparoscopic    CYSTOSCOPY      URETHRAL DILATION    LITHOTRIPSY  2013    Renal Lithotripsy    OTHER SURGICAL HISTORY  2013    Endoscopic Retrograde Cholangiopancreatography (ERCP)    OTHER SURGICAL HISTORY  2019     section    OTHER SURGICAL HISTORY  2014    ERCP With Change Of Tube/Stent    OTHER SURGICAL HISTORY  2015    Incision And Drainage Of Skin Abscess Buttocks    TONSILLECTOMY  2018    Tonsillectomy    VENTRAL HERNIA REPAIR  2018    Ventral Hernia Repair          Medications:   Current Outpatient Medications   Medication Instructions    albuterol 90 mcg/actuation inhaler 2 puffs, inhalation, Every 4 hours PRN    amoxicillin (AMOXIL) 875 mg, oral, 2 times daily    anastrozole (ARIMIDEX) 1 mg, oral, Daily    blood-glucose meter (OneTouch Verio Flex meter) misc USE AS DIRECTED    Dexcom G6 Transmitter device As needed    Dexcom G7 Sensor device CHANGE every 10 DAYS as directed    diclofenac sodium (VOLTAREN) 2 g, Topical, 4 times daily PRN    esomeprazole (NEXIUM) 40 mg, Daily before breakfast    famotidine (PEPCID) 40 mg, oral, Daily    fluticasone (Flonase) 50 mcg/actuation nasal spray 2 sprays, Each Nostril, Daily    fluticasone propion-salmeteroL (Advair Diskus) 250-50 mcg/dose diskus inhaler 1 puff, inhalation, 2 times daily    gabapentin (NEURONTIN) 800 mg, oral, 4 times daily    hydrOXYzine HCL (ATARAX) 50 mg, 4 times daily    insulin lispro (HumaLOG U-100 Insulin) 100 unit/mL injection INJECT AS DIRECTED UP TO 80 UNITS DAILY IN INSULIN PUMP    insulin lispro 100 unit/mL injection INJECT AS DIRECTED UP TO 80 UNITS DAILY IN INSULIN PUMP    lipase-protease-amylase (Creon) 24,000-76,000 -120,000 unit capsule 3 po ac and 2 po before snacks    lisinopril 2.5 mg, oral, Daily    loratadine (CLARITIN) 10 mg, oral, Daily    ondansetron (ZOFRAN) 8 mg, oral, Every 12 hours PRN  "   OneTouch Delica Plus Lancet 33 gauge misc AS DIRECTED TO CHECK BLOOD SUGAR DAILY    OneTouch Verio test strips strip AS DIRECTED TO CHECK BLOOD SUGAR DAILY    oxyCODONE (ROXICODONE) 10 mg, oral, Every 4 hours PRN    pantoprazole (PROTONIX) 40 mg, Daily before breakfast    traZODone (Desyrel) 50 mg tablet TAKE 1-3 TABLETS BY MOUTH AT BEDTIME AS NEEDED FOR ANXIETY    triamterene-hydrochlorothiazid (Maxzide-25) 37.5-25 mg tablet 1 tablet, Daily    venlafaxine XR (EFFEXOR-XR) 75 mg, oral, Daily, Take with food.        Allergies:  Allergies   Allergen Reactions    Atorvastatin Myalgia    Crestor [Rosuvastatin] Myalgia    Morphine Hives and Unknown        Physical Exam:  Last Recorded Vitals  Height 1.6 m (5' 3\"), weight 69.4 kg (153 lb). , Body mass index is 27.1 kg/m².  []General appearance: Well-developed, well-nourished in no acute distress, conversant with normal voice quality    Head/face: No erythema or edema or facial tenderness, and normal facial nerve function bilaterally    External ear: Clear external auditory canals with normal pinnae.  Left canal wall down mastoidectomy.  Bacitracin ointment cleaned from the mastoid defect.  Tympanic membrane intact.  Mild inflammation  Tube status: N/A  Middle ear: Tympanic membranes intact and mobile, middle ears normal.  Tympanic membrane perforation: N/A  Mastoid bowl: N/A  Hearing: Normal conversational awareness at normal speech thresholds    Nose visualized using: Anterior rhinoscopy  Nasal dorsum: Nontraumatic midline appearance  Septum: Midline, nonobstructing  Inferior turbinates: Normal, pink  Secretions: Dry    Oral cavity and oropharynx: Normal  Teeth: Good condition  Floor of mouth: without lesions  Palate: Normal hard palate, soft palate and uvula  Oropharynx: Clear, no lesions present  Buccal mucosa: Normal without masses or lesions  Lips: Normal    Nasopharynx: Inadequate mirror exam secondary to gag/anatomy    Neck:  Salivary glands: Normal bilateral " parotid and submandibular glands by inspection and palpation.  Non-thyroid masses: No palpable masses or significant lymphadenopathy  Trachea: Midline  Thyroid: No thyromegaly or palpable nodules  Temporomandibular joint: Nontender  Cervical range of motion: Normal    Neurologic exam: Alert and oriented x3, appropriate affect.  Cranial nerves II-XII normal bilaterally  Extraocular movement: Extraocular movement intact, normal gaze alignment        Laxmi was seen today for new patient visit.  Diagnoses and all orders for this visit:  Otitis media, chronic, left (Primary)  Uncontrolled type 1 diabetes mellitus with hyperglycemia  Idiopathic chronic pancreatitis (Multi)  Malignant neoplasm of upper-outer quadrant of right breast in female, estrogen receptor positive       PLAN  She has a complex history with a previous canal wall down radical mastoidectomy.  This actually has healed up very well for no postoperative care.  She complains of some hearing loss and pain.  I will treat her with Ciprodex for 10 days.  Follow-up in 6 weeks with an audiogram    Ford Bedoya MD

## 2025-01-31 ENCOUNTER — TELEPHONE (OUTPATIENT)
Dept: ENDOCRINOLOGY | Facility: CLINIC | Age: 55
End: 2025-01-31
Payer: COMMERCIAL

## 2025-01-31 DIAGNOSIS — J30.1 NON-SEASONAL ALLERGIC RHINITIS DUE TO POLLEN: ICD-10-CM

## 2025-01-31 RX ORDER — LORATADINE 10 MG/1
10 TABLET ORAL DAILY
Qty: 90 TABLET | Refills: 3 | Status: SHIPPED | OUTPATIENT
Start: 2025-01-31

## 2025-01-31 ASSESSMENT — ENCOUNTER SYMPTOMS
NECK PAIN: 0
COUGH: 1
VOMITING: 0
DIARRHEA: 0
HEADACHES: 0
RHINORRHEA: 1
ABDOMINAL PAIN: 0
SORE THROAT: 0

## 2025-01-31 NOTE — TELEPHONE ENCOUNTER
Delaney from [facility] called to find out Laxmi's LV 10/25/2024. Her next visit is 02/20/2025.

## 2025-02-04 DIAGNOSIS — E10.65 TYPE 1 DIABETES MELLITUS WITH HYPERGLYCEMIA (MULTI): ICD-10-CM

## 2025-02-04 RX ORDER — BLOOD-GLUCOSE METER
EACH MISCELLANEOUS
Qty: 100 STRIP | Refills: 1 | Status: SHIPPED | OUTPATIENT
Start: 2025-02-04

## 2025-02-07 ENCOUNTER — APPOINTMENT (OUTPATIENT)
Dept: PRIMARY CARE | Facility: CLINIC | Age: 55
End: 2025-02-07
Payer: COMMERCIAL

## 2025-02-07 VITALS
BODY MASS INDEX: 26.75 KG/M2 | WEIGHT: 151 LBS | OXYGEN SATURATION: 97 % | DIASTOLIC BLOOD PRESSURE: 60 MMHG | HEIGHT: 63 IN | HEART RATE: 101 BPM | SYSTOLIC BLOOD PRESSURE: 124 MMHG

## 2025-02-07 DIAGNOSIS — C50.111 MALIGNANT NEOPLASM OF CENTRAL PORTION OF RIGHT BREAST IN FEMALE, ESTROGEN RECEPTOR POSITIVE: ICD-10-CM

## 2025-02-07 DIAGNOSIS — J45.40 MODERATE PERSISTENT ASTHMA, UNSPECIFIED WHETHER COMPLICATED (HHS-HCC): ICD-10-CM

## 2025-02-07 DIAGNOSIS — M75.81 TENDINITIS OF RIGHT ROTATOR CUFF: ICD-10-CM

## 2025-02-07 DIAGNOSIS — Z17.0 MALIGNANT NEOPLASM OF CENTRAL PORTION OF RIGHT BREAST IN FEMALE, ESTROGEN RECEPTOR POSITIVE: ICD-10-CM

## 2025-02-07 DIAGNOSIS — F33.1 MDD (MAJOR DEPRESSIVE DISORDER), RECURRENT EPISODE, MODERATE: ICD-10-CM

## 2025-02-07 DIAGNOSIS — L73.2 HIDRADENITIS SUPPURATIVA: Primary | ICD-10-CM

## 2025-02-07 DIAGNOSIS — M25.531 RIGHT WRIST PAIN: ICD-10-CM

## 2025-02-07 DIAGNOSIS — H60.332 ACUTE SWIMMER'S EAR OF LEFT SIDE: ICD-10-CM

## 2025-02-07 DIAGNOSIS — Z79.891 OPIATE ANALGESIC CONTRACT EXISTS: ICD-10-CM

## 2025-02-07 DIAGNOSIS — E11.40 DIABETIC NEUROPATHY, PAINFUL (MULTI): ICD-10-CM

## 2025-02-07 PROCEDURE — 4010F ACE/ARB THERAPY RXD/TAKEN: CPT | Performed by: FAMILY MEDICINE

## 2025-02-07 PROCEDURE — 3078F DIAST BP <80 MM HG: CPT | Performed by: FAMILY MEDICINE

## 2025-02-07 PROCEDURE — 99214 OFFICE O/P EST MOD 30 MIN: CPT | Performed by: FAMILY MEDICINE

## 2025-02-07 PROCEDURE — 3074F SYST BP LT 130 MM HG: CPT | Performed by: FAMILY MEDICINE

## 2025-02-07 PROCEDURE — 20610 DRAIN/INJ JOINT/BURSA W/O US: CPT | Performed by: FAMILY MEDICINE

## 2025-02-07 PROCEDURE — 3008F BODY MASS INDEX DOCD: CPT | Performed by: FAMILY MEDICINE

## 2025-02-07 PROCEDURE — 20605 DRAIN/INJ JOINT/BURSA W/O US: CPT | Performed by: FAMILY MEDICINE

## 2025-02-07 RX ORDER — SECUKINUMAB 150 MG/ML
300 INJECTION SUBCUTANEOUS
Qty: 2 ML | Refills: 11 | Status: SHIPPED | OUTPATIENT
Start: 2025-02-07

## 2025-02-07 RX ORDER — LIDOCAINE HYDROCHLORIDE 10 MG/ML
1 INJECTION, SOLUTION INFILTRATION; PERINEURAL
Status: COMPLETED | OUTPATIENT
Start: 2025-02-07 | End: 2025-02-07

## 2025-02-07 RX ORDER — LIDOCAINE HYDROCHLORIDE 10 MG/ML
3 INJECTION, SOLUTION INFILTRATION; PERINEURAL
Status: COMPLETED | OUTPATIENT
Start: 2025-02-07 | End: 2025-02-07

## 2025-02-07 RX ORDER — TRIAMCINOLONE ACETONIDE 40 MG/ML
40 INJECTION, SUSPENSION INTRA-ARTICULAR; INTRAMUSCULAR
Status: COMPLETED | OUTPATIENT
Start: 2025-02-07 | End: 2025-02-07

## 2025-02-07 RX ORDER — TRIAMCINOLONE ACETONIDE 40 MG/ML
20 INJECTION, SUSPENSION INTRA-ARTICULAR; INTRAMUSCULAR
Status: COMPLETED | OUTPATIENT
Start: 2025-02-07 | End: 2025-02-07

## 2025-02-07 RX ORDER — CIPROFLOXACIN AND DEXAMETHASONE 3; 1 MG/ML; MG/ML
4 SUSPENSION/ DROPS AURICULAR (OTIC) 2 TIMES DAILY
Qty: 7.5 ML | Refills: 0 | Status: SHIPPED | OUTPATIENT
Start: 2025-02-07 | End: 2025-02-14

## 2025-02-07 RX ADMIN — LIDOCAINE HYDROCHLORIDE 1 ML: 10 INJECTION, SOLUTION INFILTRATION; PERINEURAL at 20:47

## 2025-02-07 RX ADMIN — TRIAMCINOLONE ACETONIDE 40 MG: 40 INJECTION, SUSPENSION INTRA-ARTICULAR; INTRAMUSCULAR at 20:47

## 2025-02-07 RX ADMIN — TRIAMCINOLONE ACETONIDE 20 MG: 40 INJECTION, SUSPENSION INTRA-ARTICULAR; INTRAMUSCULAR at 20:47

## 2025-02-07 RX ADMIN — LIDOCAINE HYDROCHLORIDE 3 ML: 10 INJECTION, SOLUTION INFILTRATION; PERINEURAL at 20:47

## 2025-02-07 ASSESSMENT — ENCOUNTER SYMPTOMS
VOMITING: 0
RHINORRHEA: 1
COUGH: 1
HEADACHES: 0
ABDOMINAL PAIN: 0
NECK PAIN: 0
SORE THROAT: 0
DIARRHEA: 0

## 2025-02-07 NOTE — PROGRESS NOTES
Subjective   Patient ID: Laxmi Sousa is a 54 y.o. female who presents for Follow-up (Medication follow up and refills /Has a few issues to discuss ).  Earache   There is pain in the left ear. This is a recurrent problem. The current episode started 1 to 4 weeks ago. The problem occurs constantly. The problem has been gradually worsening. There has been no fever. The fever has been present for Less than 1 day. The pain is at a severity of 7/10. Associated symptoms include coughing, hearing loss and rhinorrhea. Pertinent negatives include no abdominal pain, diarrhea, ear discharge, headaches, neck pain, rash, sore throat or vomiting.     Having the hidradenitis suppurativa- wants something for it  Gets painful boils on his buttocks  Has been on multiple rounds of antibiotics    Needs injection in right wrist  Also having issues with right shoulder  Achy pain  Worse- sleeping on shoulder  Better- not doing anything with it  No swelling or bruising    Pain in left flank pain- oxycodone helps with the pain (some times can get away doing only 30 mg instead of 40 mg)  Sharp, achy pain  Worse- certain movement  No n/v  Diarrhea better since back on the creon    OARRS:  Neil Louie, DO on 2/7/2025  3:32 PM  I have personally reviewed the OARRS report for Laxmi Sousa. I have considered the risks of abuse, dependence, addiction and diversion and I believe that it is clinically appropriate for Laxmi Sousa to be prescribed this medication    Is the patient prescribed a combination of a benzodiazepine and opioid?  No    Last Urine Drug Screen / ordered today: Yes  No results found for this or any previous visit (from the past 8760 hours).  Results are as expected.           Controlled Substance Agreement:  Date of the Last Agreement: 5/8/23  Reviewed Controlled Substance Agreement including but not limited to the benefits, risks, and alternatives to treatment with a Controlled Substance  medication(s).    Opioids:  What is the patient's goal of therapy? Able to function  Is this being achieved with current treatment? Yesw    I have calculated the patient's Morphine Dose Equivalent (MED):   I have considered referral to Pain Management and/or a specialist, and do not feel it is necessary at this time.    I feel that it is clinically indicated to continue this current medication regimen after consideration of alternative therapies, and other non-opioid treatment.    Opioid Risk Screening:  Family History of Substance Abuse  Alcohol: 0 (2024  4:00 PM)  Illegal Dru (2024  4:00 PM)  Prescription Dru (2024  4:00 PM)    Personal History of Substance Abuse  Alcohol: 0 (2024  4:00 PM)  Illegal Drugs: 0 (2024  4:00 PM)  Prescription Drugs: 0 (2024  4:00 PM)    Patient Age (16-45)  Age (16-45): 0 (2024  4:00 PM)    History of Preadolescent Sexual Abuse  History of Preadolescent Sexual Abuse: 0 (2024  4:00 PM)    Psychological Disease  Attention Deficit Disorder, Obsessive Compulsive Disorder, Bipolar, Schizophrenia: 0 (2024  4:00 PM)  Depression: 1 (2024  4:00 PM)    Total Score  Total Score: 1 (2024  4:00 PM)    Total Score Risk Category  TOTAL SCORE CATEGORY: Low Risk (0-3) (2024  4:00 PM)        Pain Assessment:  Analgesia  What was your pain level on average during the past week?: 4  What was your pain level at its worst during the past week?: 9  What percentage of your pain has been relieved during the past week?: 70 %  Is the amount of pain relief you are now obtaining from your current pain relievers enough to make a real difference in your life?: Y  Query to Clinician: Is the patient's pain relief clinically significant?: Yes    Activities of Daily Living  Physical Functioning: Better  Family Relationships: Better  Social Relationships: Better  Mood: Better  Sleep Patterns: Better  Overall Functioning: Better    Adverse Events  Is  patient experiencing any side effects from current pain relievers?: N  Patient's Overall Severity of Side Effects: None      Assessment  Is your overall impression that this patient is benefiting from opioid therapy?: Yes  Specific Analgesic Plan: Continue present regimen           Current Outpatient Medications:   •  albuterol 90 mcg/actuation inhaler, Inhale 2 puffs every 4 hours if needed for shortness of breath., Disp: 18 g, Rfl: 3  •  anastrozole (Arimidex) 1 mg tablet, Take 1 tablet (1 mg total) by mouth once daily., Disp: 90 tablet, Rfl: 3  •  blood-glucose meter (OneTouch Verio Flex meter) misc, USE AS DIRECTED, Disp: 1 each, Rfl: 0  •  Dexcom G6 Transmitter device, Inject under the skin if needed., Disp: , Rfl:   •  Dexcom G7 Sensor device, CHANGE every 10 DAYS as directed, Disp: 9 each, Rfl: 3  •  diclofenac sodium (Voltaren) 1 % gel, Apply 2.25 inches (2 g) topically 4 times a day as needed (joint pain)., Disp: 900 g, Rfl: 1  •  esomeprazole (NexIUM) 40 mg DR capsule, Take 1 capsule (40 mg) by mouth once daily in the morning. Take before meals., Disp: , Rfl:   •  famotidine (Pepcid) 40 mg tablet, TAKE 1 TABLET BY MOUTH EVERY DAY, Disp: 90 tablet, Rfl: 1  •  fluticasone (Flonase) 50 mcg/actuation nasal spray, ADMINISTER 2 SPRAYS INTO EACH NOSTRIL ONCE DAILY., Disp: 48 mL, Rfl: 3  •  gabapentin (Neurontin) 800 mg tablet, Take 1 tablet (800 mg) by mouth 4 times a day., Disp: 360 tablet, Rfl: 1  •  hydrOXYzine HCL (Atarax) 50 mg tablet, Take 1 tablet (50 mg) by mouth 4 times a day., Disp: , Rfl:   •  insulin lispro (HumaLOG U-100 Insulin) 100 unit/mL injection, INJECT AS DIRECTED UP TO 80 UNITS DAILY IN INSULIN PUMP, Disp: 80 mL, Rfl: 1  •  insulin lispro 100 unit/mL injection, INJECT AS DIRECTED UP TO 80 UNITS DAILY IN INSULIN PUMP, Disp: 80 mL, Rfl: 1  •  lipase-protease-amylase (Creon) 24,000-76,000 -120,000 unit capsule, 3 po ac and 2 po before snacks, Disp: 1260 capsule, Rfl: 1  •  lisinopril 2.5 mg  tablet, TAKE 1 TABLET BY MOUTH ONCE DAILY., Disp: 90 tablet, Rfl: 3  •  loratadine (Claritin) 10 mg tablet, TAKE 1 TABLET BY MOUTH EVERY DAY, Disp: 90 tablet, Rfl: 3  •  ondansetron (Zofran) 4 mg tablet, TAKE 2 TABLETS (8 MG) BY MOUTH EVERY 12 HOURS IF NEEDED FOR VOMITING OR NAUSEA., Disp: 20 tablet, Rfl: 2  •  OneTouch Delica Plus Lancet 33 gauge misc, AS DIRECTED TO CHECK BLOOD SUGAR DAILY, Disp: 100 each, Rfl: 6  •  OneTouch Verio test strips strip, AS DIRECTED TO CHECK BLOOD SUGAR DAILY, Disp: 100 strip, Rfl: 1  •  oxyCODONE (Roxicodone) 10 mg immediate release tablet, Take 1 tablet (10 mg) by mouth every 4 hours if needed for severe pain (7 - 10)., Disp: 150 tablet, Rfl: 0  •  pantoprazole (ProtoNix) 40 mg EC tablet, Take 1 tablet (40 mg) by mouth once daily in the morning. Take before meals., Disp: , Rfl:   •  traZODone (Desyrel) 50 mg tablet, TAKE 1-3 TABLETS BY MOUTH AT BEDTIME AS NEEDED FOR ANXIETY, Disp: 270 tablet, Rfl: 1  •  triamterene-hydrochlorothiazid (Maxzide-25) 37.5-25 mg tablet, Take 1 tablet by mouth once daily., Disp: , Rfl:   •  venlafaxine XR (Effexor-XR) 75 mg 24 hr capsule, TAKE 1 CAPSULE (75 MG) BY MOUTH ONCE DAILY. TAKE WITH FOOD., Disp: 90 capsule, Rfl: 1  •  brexpiprazole (Rexulti) 0.5 mg tablet, Take 1 tablet (0.5 mg) by mouth once daily., Disp: 30 tablet, Rfl: 11  •  budesonide-glycopyr-formoterol (BREZTRI) 160-9-4.8 mcg/actuation HFA aerosol inhaler, Inhale 2 puffs 2 times a day., Disp: 10.7 g, Rfl: 11  •  ciprofloxacin-dexamethasone (CiproDEX) otic suspension, Administer 4 drops into each ear 2 times a day for 7 days., Disp: 7.5 mL, Rfl: 0  •  secukinumab (Cosentyx Pen, 2 Pens,) 150 mg/mL self-injector pen, Inject 2 mL (300 mg) under the skin every 28 (twenty-eight) days., Disp: 2 mL, Rfl: 11   Past Surgical History:   Procedure Laterality Date   • BREAST LUMPECTOMY Right 2022    br ca radiation, no chemo ,taking astrozil   • BREAST LUMPECTOMY W/ NEEDLE LOCALIZATION Right 07/20/2022     [place] lymph node excisional biopsy.   • CHOLECYSTECTOMY  2013    Cholecystectomy Laparoscopic   • CYSTOSCOPY      URETHRAL DILATION   • LITHOTRIPSY  2013    Renal Lithotripsy   • OTHER SURGICAL HISTORY  2013    Endoscopic Retrograde Cholangiopancreatography (ERCP)   • OTHER SURGICAL HISTORY  2019     section   • OTHER SURGICAL HISTORY  2014    ERCP With Change Of Tube/Stent   • OTHER SURGICAL HISTORY  2015    Incision And Drainage Of Skin Abscess Buttocks   • TONSILLECTOMY  2018    Tonsillectomy   • VENTRAL HERNIA REPAIR  2018    Ventral Hernia Repair      Past Medical History:   Diagnosis Date   • Acute actinic otitis externa, left ear 2020    Acute actinic otitis externa of left ear   • Acute candidiasis of vulva and vagina 2016    Vaginitis due to Candida albicans   • Acute mastoiditis without complications, left ear 2020    Acute mastoiditis of left side   • Acute serous otitis media, left ear 2020    Non-recurrent acute serous otitis media of left ear   • Acute suppurative otitis media without spontaneous rupture of ear drum, left ear 2020    Non-recurrent acute suppurative otitis media of left ear without spontaneous rupture of tympanic membrane   • Anxiety and depression    • Asthma    • Breast cancer (Multi)    • Chronic pancreatitis (Multi)    • Cutaneous abscess of buttock 2015    Abscess of buttock, right   • Diabetic neuropathy (Multi)    • Diverticular disease of colon    • DM type 1 (diabetes mellitus, type 1) (Multi)    • Encounter for debridement of left postmastoidectomy cavity 2023   • Enlarged lymph node 2023   • Fatigue 2023   • GERD (gastroesophageal reflux disease)    • Hidradenitis suppurativa    • History of lumpectomy of right breast 2022   • History of right breast cancer     Ductal carcinoma in situ (DCIS)   • LFT elevation    • Other chronic nonsuppurative otitis  media, left ear 2021    Other chronic nonsuppurative otitis media of left ear   • Other chronic suppurative otitis media, left ear 2020    Chronic suppurative otitis media of left ear, unspecified otitis media location   • Other obesity due to excess calories 2020    Class 1 obesity due to excess calories with serious comorbidity and body mass index (BMI) of 30.0 to 30.9 in adult   • Personal history of irradiation    • Personal history of other diseases of the digestive system     History of chronic pancreatitis   • Personal history of other diseases of the respiratory system 2021    History of acute bronchitis   • Personal history of other diseases of the respiratory system 2017    History of acute sinusitis   • Personal history of other mental and behavioral disorders 2017    History of depression   • Personal history of other specified conditions 2019    History of painful urination   • Stress-induced cardiomyopathy 2023   • Swimmer's ear, left ear 2020    Acute swimmer's ear of left side   • Type 1 diabetes mellitus with hyperglycemia (Multi)    • Urethral stricture    • Viral conjunctivitis, unspecified 2021    Acute viral conjunctivitis, unspecified laterality   • Weak urinary stream 2023     Social History     Tobacco Use   • Smoking status: Every Day     Current packs/day: 0.00     Average packs/day: 0.5 packs/day for 15.0 years (7.5 ttl pk-yrs)     Types: Cigarettes     Start date: 3/5/1990     Last attempt to quit: 3/5/1998     Years since quittin.9   • Smokeless tobacco: Never   Vaping Use   • Vaping status: Never Used   Substance Use Topics   • Alcohol use: Not Currently   • Drug use: Never      Family History   Problem Relation Name Age of Onset   • Breast cancer Mother  65      Review of Systems   HENT:  Positive for ear pain, hearing loss and rhinorrhea. Negative for ear discharge and sore throat.    Respiratory:  Positive for  "cough.    Gastrointestinal:  Negative for abdominal pain, diarrhea and vomiting.   Musculoskeletal:  Negative for neck pain.   Skin:  Negative for rash.   Neurological:  Negative for headaches.       Objective   /60   Pulse 101   Ht 1.6 m (5' 3\")   Wt 68.5 kg (151 lb)   LMP  (LMP Unknown)   SpO2 97%   BMI 26.75 kg/m²    Physical Exam  Vitals and nursing note reviewed.   Constitutional:       General: She is not in acute distress.     Appearance: Normal appearance.   HENT:      Head: Normocephalic and atraumatic.   Eyes:      Extraocular Movements: Extraocular movements intact.      Conjunctiva/sclera: Conjunctivae normal.      Pupils: Pupils are equal, round, and reactive to light.   Neck:      Vascular: No carotid bruit.   Cardiovascular:      Rate and Rhythm: Normal rate and regular rhythm.      Pulses: Normal pulses.      Heart sounds: Normal heart sounds. No murmur heard.  Pulmonary:      Effort: Pulmonary effort is normal.      Breath sounds: Normal breath sounds.   Abdominal:      General: Abdomen is flat. Bowel sounds are normal.      Palpations: Abdomen is soft. There is no mass.      Tenderness: There is generalized abdominal tenderness.   Musculoskeletal:         General: Normal range of motion.      Cervical back: Normal range of motion and neck supple.      Comments: TTP over anterior right shoulder with positive impingement in shoulder  Negative drop arm  No decreased ROM    TTP around right wrist  No erythema  Minimal swelling  No laxity   Lymphadenopathy:      Cervical: No cervical adenopathy.   Skin:     Capillary Refill: Capillary refill takes less than 2 seconds.   Neurological:      General: No focal deficit present.      Mental Status: She is alert and oriented to person, place, and time.      Cranial Nerves: No cranial nerve deficit.      Motor: No weakness.      Deep Tendon Reflexes: Reflexes normal.   Psychiatric:         Mood and Affect: Mood normal.         Behavior: Behavior " normal.     Joint Injection Large/Arthrocentesis: R glenohumeral on 2/7/2025 8:47 PM  Indications: pain  Details: 25 G needle, posterior approach  Medications: 40 mg triamcinolone acetonide 40 mg/mL; 3 mL lidocaine 10 mg/mL (1 %)  Outcome: tolerated well, no immediate complications  Procedure, treatment alternatives, risks and benefits explained, specific risks discussed. Consent was given by the patient. Immediately prior to procedure a time out was called to verify the correct patient, procedure, equipment, support staff and site/side marked as required. Patient was prepped and draped in the usual sterile fashion.       Joint Injection Medium/Arthrocentesis: R radiocarpal on 2/7/2025 8:47 PM  Indications: pain  Details: 25 G needle, dorsal approach  Medications: 20 mg triamcinolone acetonide 40 mg/mL; 1 mL lidocaine 10 mg/mL (1 %)  Outcome: tolerated well, no immediate complications  Procedure, treatment alternatives, risks and benefits explained, specific risks discussed. Consent was given by the patient. Immediately prior to procedure a time out was called to verify the correct patient, procedure, equipment, support staff and site/side marked as required. Patient was prepped and draped in the usual sterile fashion.         Assessment/Plan   Problem List Items Addressed This Visit       MDD (major depressive disorder), recurrent episode, moderate    Relevant Medications    brexpiprazole (Rexulti) 0.5 mg tablet    Malignant neoplasm of central portion of right breast in female, estrogen receptor positive    Hidradenitis suppurativa - Primary    Relevant Medications    secukinumab (Cosentyx Pen, 2 Pens,) 150 mg/mL self-injector pen    Diabetic neuropathy, painful (Multi)    Asthma    Relevant Medications    budesonide-glycopyr-formoterol (BREZTRI) 160-9-4.8 mcg/actuation HFA aerosol inhaler     Other Visit Diagnoses       Acute swimmer's ear of left side        Relevant Medications    ciprofloxacin-dexamethasone  (CiproDEX) otic suspension    Opiate analgesic contract exists        Relevant Orders    Drug Screen, Urine With Reflex to Confirmation    Tendinitis of right rotator cuff            HS- Cosentyx, failed antibiotics when flares    Swimmer's ear- refilled ciprodex, keep ear dry    Breast CA- follow with oncology    Asthma- change to Breztri, avoid triggers    MDD- add Rexulti to effexor, CV exercise    Right Shoulder Tendinitis/Right wrist pain- steroid injectionTITO    Patient understands and agrees with treatment plan    Neil Louie, DO

## 2025-02-11 ENCOUNTER — DOCUMENTATION (OUTPATIENT)
Dept: PRIMARY CARE | Facility: CLINIC | Age: 55
End: 2025-02-11
Payer: COMMERCIAL

## 2025-02-11 LAB
AMPHETAMINES UR QL: NEGATIVE NG/ML
BARBITURATES UR QL: NEGATIVE NG/ML
BENZODIAZ UR QL: NEGATIVE NG/ML
BZE UR QL: NEGATIVE NG/ML
CODEINE UR-MCNC: NEGATIVE NG/ML
CREAT UR-MCNC: 49.9 MG/DL
DRUG SCREEN COMMENT UR-IMP: ABNORMAL
DRUG SCREEN COMMENT UR-IMP: ABNORMAL
HYDROCODONE UR-MCNC: NEGATIVE NG/ML
HYDROMORPHONE UR-MCNC: NEGATIVE NG/ML
METHADONE UR QL: NEGATIVE NG/ML
MORPHINE UR-MCNC: NEGATIVE NG/ML
NORHYDROCODONE UR CFM-MCNC: NEGATIVE NG/ML
NOROXYCODONE UR CFM-MCNC: 8370 NG/ML
OPIATES UR QL: ABNORMAL NG/ML
OXIDANTS UR QL: NEGATIVE MCG/ML
OXYCODONE UR QL: POSITIVE NG/ML
OXYCODONE UR-MCNC: 4310 NG/ML
OXYMORPHONE UR-MCNC: 1340 NG/ML
PCP UR QL: NEGATIVE NG/ML
PH UR: 5.5 [PH] (ref 4.5–9)
QUEST NOTES AND COMMENTS: ABNORMAL
THC UR QL: NEGATIVE NG/ML

## 2025-02-20 ENCOUNTER — APPOINTMENT (OUTPATIENT)
Dept: ENDOCRINOLOGY | Facility: CLINIC | Age: 55
End: 2025-02-20
Payer: COMMERCIAL

## 2025-02-20 VITALS
WEIGHT: 147 LBS | DIASTOLIC BLOOD PRESSURE: 81 MMHG | HEART RATE: 102 BPM | BODY MASS INDEX: 26.05 KG/M2 | HEIGHT: 63 IN | SYSTOLIC BLOOD PRESSURE: 113 MMHG

## 2025-02-20 DIAGNOSIS — E10.65 TYPE 1 DIABETES MELLITUS WITH HYPERGLYCEMIA (MULTI): Primary | ICD-10-CM

## 2025-02-20 DIAGNOSIS — E78.2 MIXED HYPERLIPIDEMIA: ICD-10-CM

## 2025-02-20 LAB — POC HEMOGLOBIN A1C: 7.8 % (ref 4.2–6.5)

## 2025-02-20 PROCEDURE — 83036 HEMOGLOBIN GLYCOSYLATED A1C: CPT | Performed by: INTERNAL MEDICINE

## 2025-02-20 PROCEDURE — 99214 OFFICE O/P EST MOD 30 MIN: CPT | Performed by: INTERNAL MEDICINE

## 2025-02-20 PROCEDURE — 95251 CONT GLUC MNTR ANALYSIS I&R: CPT | Performed by: INTERNAL MEDICINE

## 2025-02-20 NOTE — PATIENT INSTRUCTIONS
Your A1c was 7.8% today - great work!     No changes to your pump settings today.     Work on pre-bolusing BEFORE meals when possible.   -Also be sure to add up the total grams of carbs for your entire meal.   -If you start dropping too low around meals, call the office and we can get you in sooner if needed to adjust your settings.     Follow up 6 months Dr. Ruiz.

## 2025-02-20 NOTE — PROGRESS NOTES
Subjective   Patient ID: Laxmi Sousa is a 54 y.o. female who presents for Diabetes.  HPI  55yo with Diabetes type 3c (dx 2011) (neuropathy), HTN, Dyslipidemia,depression/anxiety, chronic pancreatitis due to pancreatic divisum. Recent poor follow up due to transportation issues.   Last A1c 9.2% - today 7.8%.     Pt is testing sugars >4 times per day using dexcom G7.  Pt is having low sugars <1-2 times/week. Pt is following a carb controlled diet and knows reasonable carb allowances. Working on counting and entering carbs in pump for her meals - was previously not entering carbs d/t concerns for lows.   Pt is struggling to afford their medications, humalog vials [facility] PAP    Tandem tslim with dexcom G7, control IQ   - pump training with [facility] Aug 2022,  needed 2 replacement pumps 2023    Basal:           12a 0.8 u/hr            10a 1.0 u/hr         ICR:           12a 1:10  ISF:           12a 50    -reports working on entering carbs for meals since last visit   -historically not bolusing for meals, not counting carbs or using any sort of base doses  -no sleep schedule set in new pump, can revisit future f/u   -has back up basal insulin at home in case of pump failure    30 days Dexcom data downloaded - 66% in target, <1% lows, avg glucose 164, patterns: mid 100's overnight / on waking, mid 100's throughout the day, upper 100's late afternoon, and upper 100's / low 200's in the evening / before bed.     Current Outpatient Medications:     albuterol 90 mcg/actuation inhaler, Inhale 2 puffs every 4 hours if needed for shortness of breath., Disp: 18 g, Rfl: 3    anastrozole (Arimidex) 1 mg tablet, Take 1 tablet (1 mg total) by mouth once daily., Disp: 90 tablet, Rfl: 3    blood-glucose meter (OneTouch Verio Flex meter) misc, USE AS DIRECTED, Disp: 1 each, Rfl: 0    brexpiprazole (Rexulti) 0.5 mg tablet, Take 1 tablet (0.5 mg) by mouth once daily., Disp: 30 tablet, Rfl: 11    budesonide-glycopyr-formoterol (BREZTRI)  160-9-4.8 mcg/actuation HFA aerosol inhaler, Inhale 2 puffs 2 times a day., Disp: 10.7 g, Rfl: 11    Dexcom G6 Transmitter device, Inject under the skin if needed., Disp: , Rfl:     Dexcom G7 Sensor device, CHANGE every 10 DAYS as directed, Disp: 9 each, Rfl: 3    diclofenac sodium (Voltaren) 1 % gel, Apply 2.25 inches (2 g) topically 4 times a day as needed (joint pain)., Disp: 900 g, Rfl: 1    esomeprazole (NexIUM) 40 mg DR capsule, Take 1 capsule (40 mg) by mouth once daily in the morning. Take before meals., Disp: , Rfl:     famotidine (Pepcid) 40 mg tablet, TAKE 1 TABLET BY MOUTH EVERY DAY, Disp: 90 tablet, Rfl: 1    fluticasone (Flonase) 50 mcg/actuation nasal spray, ADMINISTER 2 SPRAYS INTO EACH NOSTRIL ONCE DAILY., Disp: 48 mL, Rfl: 3    gabapentin (Neurontin) 800 mg tablet, Take 1 tablet (800 mg) by mouth 4 times a day., Disp: 360 tablet, Rfl: 1    hydrOXYzine HCL (Atarax) 50 mg tablet, Take 1 tablet (50 mg) by mouth 4 times a day., Disp: , Rfl:     insulin lispro (HumaLOG U-100 Insulin) 100 unit/mL injection, INJECT AS DIRECTED UP TO 80 UNITS DAILY IN INSULIN PUMP, Disp: 80 mL, Rfl: 1    insulin lispro 100 unit/mL injection, INJECT AS DIRECTED UP TO 80 UNITS DAILY IN INSULIN PUMP, Disp: 80 mL, Rfl: 1    lipase-protease-amylase (Creon) 24,000-76,000 -120,000 unit capsule, 3 po ac and 2 po before snacks, Disp: 1260 capsule, Rfl: 1    lisinopril 2.5 mg tablet, TAKE 1 TABLET BY MOUTH ONCE DAILY., Disp: 90 tablet, Rfl: 3    loratadine (Claritin) 10 mg tablet, TAKE 1 TABLET BY MOUTH EVERY DAY, Disp: 90 tablet, Rfl: 3    ondansetron (Zofran) 4 mg tablet, TAKE 2 TABLETS (8 MG) BY MOUTH EVERY 12 HOURS IF NEEDED FOR VOMITING OR NAUSEA., Disp: 20 tablet, Rfl: 2    OneTouch Delica Plus Lancet 33 gauge misc, AS DIRECTED TO CHECK BLOOD SUGAR DAILY, Disp: 100 each, Rfl: 6    OneTouch Verio test strips strip, AS DIRECTED TO CHECK BLOOD SUGAR DAILY, Disp: 100 strip, Rfl: 1    pantoprazole (ProtoNix) 40 mg EC tablet, Take 1  "tablet (40 mg) by mouth once daily in the morning. Take before meals., Disp: , Rfl:     secukinumab (Cosentyx Pen, 2 Pens,) 150 mg/mL self-injector pen, Inject 2 mL (300 mg) under the skin every 28 (twenty-eight) days., Disp: 2 mL, Rfl: 11    traZODone (Desyrel) 50 mg tablet, TAKE 1-3 TABLETS BY MOUTH AT BEDTIME AS NEEDED FOR ANXIETY, Disp: 270 tablet, Rfl: 1    triamterene-hydrochlorothiazid (Maxzide-25) 37.5-25 mg tablet, Take 1 tablet by mouth once daily., Disp: , Rfl:     venlafaxine XR (Effexor-XR) 75 mg 24 hr capsule, TAKE 1 CAPSULE (75 MG) BY MOUTH ONCE DAILY. TAKE WITH FOOD., Disp: 90 capsule, Rfl: 1   Allergies   Allergen Reactions    Atorvastatin Myalgia    Crestor [Rosuvastatin] Myalgia    Morphine Hives and Unknown       Review of Systems  See HPI.     Objective   /81   Pulse 102   Ht 1.6 m (5' 3\")   Wt 66.7 kg (147 lb)   LMP  (LMP Unknown)   BMI 26.04 kg/m²     Labs:   Lab Results   Component Value Date    HGBA1C 7.8 (A) 02/20/2025     Lab Results   Component Value Date    CALCIUM 9.4 01/22/2025    AST 55 (H) 01/22/2025    ALKPHOS 426 (H) 01/22/2025    BILITOT 0.3 01/22/2025    PROT 6.6 01/22/2025    ALBUMIN 3.6 01/22/2025     01/22/2025    K 4.8 01/22/2025     01/22/2025    CO2 30 01/22/2025    ANIONGAP 11 01/22/2025    BUN 20 01/22/2025    CREATININE 0.76 01/22/2025    GLUCOSE 165 (H) 01/22/2025    ALT 96 (H) 01/22/2025    EGFR >90 01/22/2025     Lab Results   Component Value Date    WBC 5.8 01/22/2025    NRBC 0.0 08/23/2020    RBC 3.93 (L) 01/22/2025    HGB 12.3 01/22/2025    HCT 38.9 01/22/2025     01/22/2025     Lab Results   Component Value Date    CHOL 176 06/13/2022    TRIG 107 06/13/2022    HDL 64.2 06/13/2022     Lab Results   Component Value Date    CREATU 49.9 02/07/2025    UTPCR 0.32 (H) 09/15/2023     Lab Results   Component Value Date    TSH 2.12 06/25/2019     Lab Results   Component Value Date    ZMUZKXZT24 601 06/13/2022     No results found for: " "\"VITD25\"  No results found for: \"PTH\"  Lab Results   Component Value Date    MG 1.75 08/22/2020       Assessment    1. Type 1 diabetes mellitus with hyperglycemia (Multi)    2. Mixed hyperlipidemia        Medical Decision Making  Complexity of problem: Chronic illness of diabetes mellitus uncontrolled, progressing  Data analyzed and reviewed: Reviewed prior notes, blood glucose data, labs including HgbA1c, lipids, serum chemistries.  Ordered tests.   Risk of complications and morbidities: Is definite because of use of insulin and risk of hypoglycemia.  Prescription medications reviewed and modifications made.  Compliance assessed.  Addressed social determinants of health including food insecurity.    Plan   1. Type 1 diabetes mellitus with hyperglycemia (Multi) (Primary)  - POCT glycosylated hemoglobin (Hb A1C) manually resulted    -A1c ordered and reviewed.   -CGM data downloaded and reviewed.   -Labs reviewed.     -A1c much improved from last visit - pt has been working on counting carbs and bolusing for meals.   -Continues to struggle with pre-bolusing and admits to entering her carbs after she's already start eating, or entering less carbs than what she actually eats.   -No changes to pump settings recommended today - reinforced bolusing 15 minutes before meals and adding up the total grams of carbs for an entire meal.     -Reviewed labs - recommended pt follow up with GI at least once a year given her elevated alk phos levels and history of GI issues.     -BP at target today - continue current therapy.     2. Mixed hyperlipidemia  -Not currently on statin - has taken both atorvastatin and rosuvastatin in the past, had a lot of pain on both.       -labs/tests/notes reviewed  -reviewed and counseled patient on medication monitoring and side effects    Follow Up: 6 months BB     Treatment and plan discussed with Dr. Selvin Ruiz.   OH Castillo, PharmD, BCACP, CDCES.   "

## 2025-02-20 NOTE — PROGRESS NOTES
Attestation signed by Selvin Ruiz MD on 2/20/25 at 4:28 PM.    I, Dr Selvin Ruiz, have reviewed this progress note, medication list, vital signs, any pertinent lab values, and any CGM data if present with the [facility] and  face to face during this visit today. This note reflects the treatment plan that was made under my direction after reviewing the above mentioned elements while face to face with the patient and CDE.  I personally answered and addressed any questions and concerns the patient had during the visit today.  The CDE entered the data in this note under my direction and I personally reviewed it, signed any lab or medication orders that I instructed to be completed. I am the billing provider for this visit and the level of service was determined by my involvement in the Medical Decision Making Component of this visit while face to face with the patient.

## 2025-02-21 ENCOUNTER — TELEPHONE (OUTPATIENT)
Dept: ENDOCRINOLOGY | Facility: CLINIC | Age: 55
End: 2025-02-21
Payer: COMMERCIAL

## 2025-02-28 DIAGNOSIS — E11.40 DIABETIC NEUROPATHY, PAINFUL (MULTI): ICD-10-CM

## 2025-02-28 RX ORDER — GABAPENTIN 800 MG/1
800 TABLET ORAL 4 TIMES DAILY
Qty: 360 TABLET | Refills: 1 | Status: SHIPPED | OUTPATIENT
Start: 2025-02-28 | End: 2025-08-27

## 2025-03-04 DIAGNOSIS — E11.40 DIABETIC NEUROPATHY, PAINFUL (MULTI): ICD-10-CM

## 2025-03-04 RX ORDER — GABAPENTIN 800 MG/1
800 TABLET ORAL 4 TIMES DAILY
Qty: 360 TABLET | Refills: 1 | Status: SHIPPED | OUTPATIENT
Start: 2025-03-04 | End: 2025-08-31

## 2025-03-05 ENCOUNTER — TELEPHONE (OUTPATIENT)
Dept: PRIMARY CARE | Facility: CLINIC | Age: 55
End: 2025-03-05
Payer: COMMERCIAL

## 2025-03-05 DIAGNOSIS — R10.9 CHRONIC ABDOMINAL PAIN: ICD-10-CM

## 2025-03-05 DIAGNOSIS — G89.29 CHRONIC ABDOMINAL PAIN: ICD-10-CM

## 2025-03-05 DIAGNOSIS — K86.0 ALCOHOL-INDUCED CHRONIC PANCREATITIS (MULTI): ICD-10-CM

## 2025-03-05 RX ORDER — OXYCODONE HYDROCHLORIDE 10 MG/1
10 TABLET ORAL EVERY 4 HOURS PRN
Qty: 150 TABLET | Refills: 0 | Status: SHIPPED | OUTPATIENT
Start: 2025-03-05 | End: 2025-04-04

## 2025-03-12 ENCOUNTER — APPOINTMENT (OUTPATIENT)
Dept: OTOLARYNGOLOGY | Facility: CLINIC | Age: 55
End: 2025-03-12
Payer: COMMERCIAL

## 2025-03-12 ENCOUNTER — APPOINTMENT (OUTPATIENT)
Dept: AUDIOLOGY | Facility: CLINIC | Age: 55
End: 2025-03-12
Payer: COMMERCIAL

## 2025-03-14 ENCOUNTER — TELEPHONE (OUTPATIENT)
Dept: ENDOCRINOLOGY | Facility: CLINIC | Age: 55
End: 2025-03-14
Payer: COMMERCIAL

## 2025-03-17 ENCOUNTER — APPOINTMENT (OUTPATIENT)
Dept: SURGERY | Facility: CLINIC | Age: 55
End: 2025-03-17
Payer: COMMERCIAL

## 2025-03-17 VITALS
HEART RATE: 101 BPM | OXYGEN SATURATION: 94 % | WEIGHT: 148.4 LBS | TEMPERATURE: 97.3 F | BODY MASS INDEX: 26.29 KG/M2 | DIASTOLIC BLOOD PRESSURE: 86 MMHG | SYSTOLIC BLOOD PRESSURE: 147 MMHG | HEIGHT: 63 IN

## 2025-03-17 DIAGNOSIS — R92.8 ABNORMAL MAMMOGRAM OF RIGHT BREAST: ICD-10-CM

## 2025-03-17 DIAGNOSIS — C50.511 MALIGNANT NEOPLASM OF LOWER-OUTER QUADRANT OF RIGHT BREAST OF FEMALE, ESTROGEN RECEPTOR POSITIVE: Primary | ICD-10-CM

## 2025-03-17 DIAGNOSIS — Z17.0 MALIGNANT NEOPLASM OF LOWER-OUTER QUADRANT OF RIGHT BREAST OF FEMALE, ESTROGEN RECEPTOR POSITIVE: Primary | ICD-10-CM

## 2025-03-17 PROCEDURE — 3008F BODY MASS INDEX DOCD: CPT | Performed by: SURGERY

## 2025-03-17 PROCEDURE — 3077F SYST BP >= 140 MM HG: CPT | Performed by: SURGERY

## 2025-03-17 PROCEDURE — 4010F ACE/ARB THERAPY RXD/TAKEN: CPT | Performed by: SURGERY

## 2025-03-17 PROCEDURE — 99213 OFFICE O/P EST LOW 20 MIN: CPT | Performed by: SURGERY

## 2025-03-17 PROCEDURE — 3079F DIAST BP 80-89 MM HG: CPT | Performed by: SURGERY

## 2025-03-17 NOTE — PROGRESS NOTES
Subjective   Patient ID: Laxmi Sousa is a 54 y.o. female who presents for Follow-up (EST annual breast exam).  HPI this is a very pleasant patient with a history of invasive breast cancer on the right diagnosed in 2022.  The patient had a pT1c, N0 cancer that was grade 2.  With lymphatic invasion.  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  Margins were clear for both.  The patient had postlumpectomy radiation.  The patient is on 5 years of anastrozole.    Review of Systems she denies any cough or bony aches or pains.  She did have a respiratory infection last month but that is clearing up now.    Objective   Physical Exam  On physical exam the breasts are symmetric there is a little surgical deformity to the right.  There are no skin changes otherwise and no nipple retraction.  There are no palpable masses in either breast.  Axillae are negative and supraclavicular fossae are negative.  Assessment/Plan impression at this time is that of estrogen positive T1c N0 breast cancer on the right treated with postlumpectomy radiation and hormonal therapy.    Her last mammogram showed an abnormality in the right axilla a follow-up ultrasound was obtained which was unremarkable and she is due again for bilateral mammogram and right axillary ultrasound.  Those have been ordered and we will call her with those results.           Jayla Alonzo MD 03/17/25 4:38 PM

## 2025-03-20 DIAGNOSIS — E10.65 TYPE 1 DIABETES MELLITUS WITH HYPERGLYCEMIA (MULTI): ICD-10-CM

## 2025-03-20 DIAGNOSIS — K21.9 GASTROESOPHAGEAL REFLUX DISEASE WITHOUT ESOPHAGITIS: ICD-10-CM

## 2025-03-20 DIAGNOSIS — G47.00 INSOMNIA, UNSPECIFIED: ICD-10-CM

## 2025-03-21 ENCOUNTER — TELEPHONE (OUTPATIENT)
Dept: PRIMARY CARE | Facility: CLINIC | Age: 55
End: 2025-03-21
Payer: COMMERCIAL

## 2025-03-21 DIAGNOSIS — K86.81 EXOCRINE PANCREATIC INSUFFICIENCY (HHS-HCC): ICD-10-CM

## 2025-03-21 RX ORDER — TRAZODONE HYDROCHLORIDE 50 MG/1
TABLET ORAL
Qty: 270 TABLET | Refills: 1 | Status: SHIPPED | OUTPATIENT
Start: 2025-03-21

## 2025-03-21 RX ORDER — FAMOTIDINE 40 MG/1
40 TABLET, FILM COATED ORAL DAILY
Qty: 90 TABLET | Refills: 1 | Status: SHIPPED | OUTPATIENT
Start: 2025-03-21

## 2025-03-21 RX ORDER — INSULIN LISPRO 100 [IU]/ML
INJECTION, SOLUTION INTRAVENOUS; SUBCUTANEOUS
Qty: 80 ML | Refills: 1 | Status: SHIPPED | OUTPATIENT
Start: 2025-03-21 | End: 2026-03-21

## 2025-03-21 RX ORDER — PANCRELIPASE 24000; 76000; 120000 [USP'U]/1; [USP'U]/1; [USP'U]/1
CAPSULE, DELAYED RELEASE PELLETS ORAL
Qty: 100 CAPSULE | Refills: 1 | Status: SHIPPED | OUTPATIENT
Start: 2025-03-21

## 2025-03-21 NOTE — TELEPHONE ENCOUNTER
CREON 19490 VR/H CAP SEND TO [facility]   TAKES 3 CAPS BEFORE MEALS AND 2 WITH SNACKS MAX DOSE IS 14 #100 CAPS

## 2025-03-27 ENCOUNTER — HOSPITAL ENCOUNTER (OUTPATIENT)
Dept: RADIOLOGY | Facility: HOSPITAL | Age: 55
Discharge: HOME | End: 2025-03-27
Payer: MEDICARE

## 2025-03-27 VITALS — HEIGHT: 63 IN | WEIGHT: 148 LBS | BODY MASS INDEX: 26.22 KG/M2

## 2025-03-27 DIAGNOSIS — R92.8 ABNORMAL MAMMOGRAM OF RIGHT BREAST: ICD-10-CM

## 2025-03-27 PROCEDURE — 76642 ULTRASOUND BREAST LIMITED: CPT | Mod: RT

## 2025-03-27 PROCEDURE — 77065 DX MAMMO INCL CAD UNI: CPT | Mod: RT

## 2025-04-07 DIAGNOSIS — J01.80 ACUTE NON-RECURRENT SINUSITIS OF OTHER SINUS: ICD-10-CM

## 2025-04-07 RX ORDER — AMOXICILLIN 875 MG/1
875 TABLET, FILM COATED ORAL 2 TIMES DAILY
Qty: 20 TABLET | Refills: 0 | Status: SHIPPED | OUTPATIENT
Start: 2025-04-07 | End: 2025-04-17

## 2025-04-22 ENCOUNTER — TELEPHONE (OUTPATIENT)
Dept: PRIMARY CARE | Facility: CLINIC | Age: 55
End: 2025-04-22
Payer: COMMERCIAL

## 2025-04-23 DIAGNOSIS — G89.29 CHRONIC ABDOMINAL PAIN: ICD-10-CM

## 2025-04-23 DIAGNOSIS — R10.9 CHRONIC ABDOMINAL PAIN: ICD-10-CM

## 2025-04-23 DIAGNOSIS — K86.0 ALCOHOL-INDUCED CHRONIC PANCREATITIS (MULTI): ICD-10-CM

## 2025-04-23 RX ORDER — OXYCODONE HYDROCHLORIDE 10 MG/1
10 TABLET ORAL EVERY 4 HOURS PRN
Qty: 150 TABLET | Refills: 0 | Status: SHIPPED | OUTPATIENT
Start: 2025-04-23 | End: 2025-05-23

## 2025-04-24 DIAGNOSIS — K86.81 EXOCRINE PANCREATIC INSUFFICIENCY (HHS-HCC): ICD-10-CM

## 2025-04-24 RX ORDER — PANCRELIPASE 24000; 76000; 120000 [USP'U]/1; [USP'U]/1; [USP'U]/1
CAPSULE, DELAYED RELEASE PELLETS ORAL
Qty: 400 CAPSULE | Refills: 1 | Status: SHIPPED | OUTPATIENT
Start: 2025-04-24

## 2025-05-22 DIAGNOSIS — H66.006 RECURRENT ACUTE SUPPURATIVE OTITIS MEDIA WITHOUT SPONTANEOUS RUPTURE OF TYMPANIC MEMBRANE OF BOTH SIDES: Primary | ICD-10-CM

## 2025-05-22 RX ORDER — CEFDINIR 300 MG/1
300 CAPSULE ORAL 2 TIMES DAILY
Qty: 20 CAPSULE | Refills: 0 | Status: SHIPPED | OUTPATIENT
Start: 2025-05-22 | End: 2025-06-01

## 2025-05-28 DIAGNOSIS — G89.29 CHRONIC ABDOMINAL PAIN: ICD-10-CM

## 2025-05-28 DIAGNOSIS — J45.40 MODERATE PERSISTENT ASTHMA, UNSPECIFIED WHETHER COMPLICATED (HHS-HCC): ICD-10-CM

## 2025-05-28 DIAGNOSIS — R10.9 CHRONIC ABDOMINAL PAIN: ICD-10-CM

## 2025-05-28 DIAGNOSIS — K86.0 ALCOHOL-INDUCED CHRONIC PANCREATITIS (MULTI): ICD-10-CM

## 2025-05-28 RX ORDER — ALBUTEROL SULFATE 90 UG/1
2 INHALANT RESPIRATORY (INHALATION) EVERY 4 HOURS PRN
Qty: 18 G | Refills: 3 | Status: SHIPPED | OUTPATIENT
Start: 2025-05-28

## 2025-05-28 RX ORDER — OXYCODONE HYDROCHLORIDE 10 MG/1
10 TABLET ORAL EVERY 4 HOURS PRN
Qty: 150 TABLET | Refills: 0 | Status: SHIPPED | OUTPATIENT
Start: 2025-05-28 | End: 2025-06-27

## 2025-06-04 ENCOUNTER — TELEPHONE (OUTPATIENT)
Dept: PRIMARY CARE | Facility: CLINIC | Age: 55
End: 2025-06-04
Payer: COMMERCIAL

## 2025-06-04 NOTE — TELEPHONE ENCOUNTER
Per insurance, needs TB test prior to prior auth for Cosentyx  Please put order in - has an appointment tomorrow at 4:15

## 2025-06-05 ENCOUNTER — APPOINTMENT (OUTPATIENT)
Dept: PRIMARY CARE | Facility: CLINIC | Age: 55
End: 2025-06-05
Payer: COMMERCIAL

## 2025-07-01 DIAGNOSIS — K86.0 ALCOHOL-INDUCED CHRONIC PANCREATITIS (MULTI): ICD-10-CM

## 2025-07-01 DIAGNOSIS — R10.9 CHRONIC ABDOMINAL PAIN: ICD-10-CM

## 2025-07-01 DIAGNOSIS — G89.29 CHRONIC ABDOMINAL PAIN: ICD-10-CM

## 2025-07-01 DIAGNOSIS — F33.1 MDD (MAJOR DEPRESSIVE DISORDER), RECURRENT EPISODE, MODERATE: ICD-10-CM

## 2025-07-01 DIAGNOSIS — J45.40 MODERATE PERSISTENT ASTHMA, UNSPECIFIED WHETHER COMPLICATED (HHS-HCC): ICD-10-CM

## 2025-07-02 RX ORDER — OXYCODONE HYDROCHLORIDE 10 MG/1
10 TABLET ORAL EVERY 4 HOURS PRN
Qty: 150 TABLET | Refills: 0 | Status: SHIPPED | OUTPATIENT
Start: 2025-07-02 | End: 2025-08-01

## 2025-07-27 DIAGNOSIS — F33.1 MDD (MAJOR DEPRESSIVE DISORDER), RECURRENT EPISODE, MODERATE: ICD-10-CM

## 2025-07-28 RX ORDER — VENLAFAXINE HYDROCHLORIDE 75 MG/1
75 CAPSULE, EXTENDED RELEASE ORAL DAILY
Qty: 90 CAPSULE | Refills: 1 | Status: SHIPPED | OUTPATIENT
Start: 2025-07-28 | End: 2026-01-24

## 2025-08-08 DIAGNOSIS — J45.40 MODERATE PERSISTENT ASTHMA, UNSPECIFIED WHETHER COMPLICATED (HHS-HCC): ICD-10-CM

## 2025-08-08 RX ORDER — FLUTICASONE PROPIONATE AND SALMETEROL 250; 50 UG/1; UG/1
1 POWDER RESPIRATORY (INHALATION) 2 TIMES DAILY
Qty: 180 EACH | Refills: 1 | Status: SHIPPED | OUTPATIENT
Start: 2025-08-08

## 2025-08-12 ENCOUNTER — PATIENT MESSAGE (OUTPATIENT)
Dept: SURGERY | Facility: CLINIC | Age: 55
End: 2025-08-12
Payer: COMMERCIAL

## 2025-08-12 DIAGNOSIS — R92.8 ABNORMAL ULTRASOUND OF BREAST: ICD-10-CM

## 2025-08-14 DIAGNOSIS — N30.01 ACUTE CYSTITIS WITH HEMATURIA: ICD-10-CM

## 2025-08-14 RX ORDER — NITROFURANTOIN 25; 75 MG/1; MG/1
100 CAPSULE ORAL 2 TIMES DAILY
Qty: 10 CAPSULE | Refills: 0 | Status: SHIPPED | OUTPATIENT
Start: 2025-08-14 | End: 2025-08-19

## 2025-08-25 ENCOUNTER — TELEPHONE (OUTPATIENT)
Dept: PRIMARY CARE | Facility: CLINIC | Age: 55
End: 2025-08-25
Payer: COMMERCIAL

## 2025-08-25 DIAGNOSIS — G89.29 CHRONIC ABDOMINAL PAIN: ICD-10-CM

## 2025-08-25 DIAGNOSIS — K86.0 ALCOHOL-INDUCED CHRONIC PANCREATITIS (MULTI): ICD-10-CM

## 2025-08-25 DIAGNOSIS — R10.9 CHRONIC ABDOMINAL PAIN: ICD-10-CM

## 2025-08-25 RX ORDER — OXYCODONE HYDROCHLORIDE 10 MG/1
10 TABLET ORAL EVERY 4 HOURS PRN
Qty: 150 TABLET | Refills: 0 | Status: SHIPPED | OUTPATIENT
Start: 2025-08-25 | End: 2025-09-24

## 2025-08-27 DIAGNOSIS — K86.81 EXOCRINE PANCREATIC INSUFFICIENCY (HHS-HCC): ICD-10-CM

## 2025-08-27 DIAGNOSIS — H60.332 ACUTE SWIMMER'S EAR OF LEFT SIDE: ICD-10-CM

## 2025-08-27 RX ORDER — PANCRELIPASE 24000; 76000; 120000 [USP'U]/1; [USP'U]/1; [USP'U]/1
CAPSULE, DELAYED RELEASE PELLETS ORAL
Qty: 400 CAPSULE | Refills: 1 | Status: SHIPPED | OUTPATIENT
Start: 2025-08-27

## 2025-08-27 RX ORDER — CIPROFLOXACIN AND DEXAMETHASONE 3; 1 MG/ML; MG/ML
4 SUSPENSION/ DROPS AURICULAR (OTIC) 2 TIMES DAILY
Qty: 7.5 ML | Refills: 0 | Status: SHIPPED | OUTPATIENT
Start: 2025-08-27 | End: 2025-09-03

## 2025-09-08 ENCOUNTER — APPOINTMENT (OUTPATIENT)
Dept: PRIMARY CARE | Facility: CLINIC | Age: 55
End: 2025-09-08
Payer: COMMERCIAL

## (undated) DEVICE — BAG, DRAINAGE, SAFETY FLOW, OUTLET DEVICE

## (undated) DEVICE — DRESSING, NON-ADHERENT, TELFA, OUCHLESS, 3 X 8 IN, STERILE

## (undated) DEVICE — TOWEL PACK, STERILE, 4/PACK, BLUE

## (undated) DEVICE — TUBING, SUCTION, CONNECTING, NON-CONDUCTIVE, SURE GRIP CONNECTORS, 3/16 IN X 10 FT

## (undated) DEVICE — WATER STERILE, FOR IRRIGATION, FLEXIBLE, 3000ML, W/HANGER

## (undated) DEVICE — SYRINGE, 60 CC, IRRIGATION, PISTON, CATH TIP, W/LUER ADAPTER,DISP

## (undated) DEVICE — COLLECTION BAG, FLUID, NON-STERILE

## (undated) DEVICE — NEEDLE, SAFETY, 18 G X 1.5 IN

## (undated) DEVICE — Device